# Patient Record
Sex: FEMALE | Race: WHITE | NOT HISPANIC OR LATINO | Employment: PART TIME | URBAN - METROPOLITAN AREA
[De-identification: names, ages, dates, MRNs, and addresses within clinical notes are randomized per-mention and may not be internally consistent; named-entity substitution may affect disease eponyms.]

---

## 2020-05-13 ENCOUNTER — HOSPITAL ENCOUNTER (OUTPATIENT)
Dept: OTHER | Facility: HOSPITAL | Age: 20
Discharge: HOME | End: 2020-05-13
Attending: PHYSICIAN ASSISTANT

## 2020-05-13 ENCOUNTER — OFFICE VISIT CONVERTED (OUTPATIENT)
Dept: INTERNAL MEDICINE | Facility: CLINIC | Age: 20
End: 2020-05-13
Attending: PHYSICIAN ASSISTANT

## 2020-05-13 LAB
ALBUMIN SERPL-MCNC: 4.6 G/DL (ref 3.5–5)
ALBUMIN/GLOB SERPL: 1.4 {RATIO} (ref 1.4–2.6)
ALP SERPL-CCNC: 58 U/L (ref 50–130)
ALT SERPL-CCNC: 14 U/L (ref 10–40)
ANION GAP SERPL CALC-SCNC: 17 MMOL/L (ref 8–19)
AST SERPL-CCNC: 16 U/L (ref 15–50)
BASOPHILS # BLD AUTO: 0.03 10*3/UL (ref 0–0.2)
BASOPHILS NFR BLD AUTO: 0.2 % (ref 0–3)
BILIRUB SERPL-MCNC: 0.37 MG/DL (ref 0.2–1.3)
BUN SERPL-MCNC: 8 MG/DL (ref 5–25)
BUN/CREAT SERPL: 12 {RATIO} (ref 6–20)
CALCIUM SERPL-MCNC: 9 MG/DL (ref 8.7–10.4)
CHLORIDE SERPL-SCNC: 104 MMOL/L (ref 99–111)
CHOLEST SERPL-MCNC: 124 MG/DL (ref 107–200)
CHOLEST/HDLC SERPL: 2.4 {RATIO} (ref 3–6)
CONV ABS IMM GRAN: 0.04 10*3/UL (ref 0–0.2)
CONV CO2: 21 MMOL/L (ref 22–32)
CONV IMMATURE GRAN: 0.3 % (ref 0–1.8)
CONV TOTAL PROTEIN: 7.8 G/DL (ref 6.3–8.2)
CREAT UR-MCNC: 0.68 MG/DL (ref 0.5–0.9)
DEPRECATED RDW RBC AUTO: 40.1 FL (ref 36.4–46.3)
EOSINOPHIL # BLD AUTO: 0.17 10*3/UL (ref 0–0.7)
EOSINOPHIL # BLD AUTO: 1.3 % (ref 0–7)
ERYTHROCYTE [DISTWIDTH] IN BLOOD BY AUTOMATED COUNT: 12 % (ref 11.7–14.4)
EST. AVERAGE GLUCOSE BLD GHB EST-MCNC: 88 MG/DL
FERRITIN SERPL-MCNC: 94 NG/ML (ref 10–200)
GFR SERPLBLD BASED ON 1.73 SQ M-ARVRAT: >60 ML/MIN/{1.73_M2}
GLOBULIN UR ELPH-MCNC: 3.2 G/DL (ref 2–3.5)
GLUCOSE SERPL-MCNC: 91 MG/DL (ref 65–99)
HBA1C MFR BLD: 4.7 % (ref 3.5–5.7)
HCT VFR BLD AUTO: 39.9 % (ref 37–47)
HDLC SERPL-MCNC: 52 MG/DL (ref 40–60)
HGB BLD-MCNC: 13.7 G/DL (ref 12–16)
IRON SATN MFR SERPL: 34 % (ref 20–55)
IRON SERPL-MCNC: 131 UG/DL (ref 60–170)
LDLC SERPL CALC-MCNC: 56 MG/DL (ref 70–100)
LYMPHOCYTES # BLD AUTO: 2.78 10*3/UL (ref 1–5)
LYMPHOCYTES NFR BLD AUTO: 21.6 % (ref 20–45)
MCH RBC QN AUTO: 31.2 PG (ref 27–31)
MCHC RBC AUTO-ENTMCNC: 34.3 G/DL (ref 33–37)
MCV RBC AUTO: 90.9 FL (ref 81–99)
MONOCYTES # BLD AUTO: 0.9 10*3/UL (ref 0.2–1.2)
MONOCYTES NFR BLD AUTO: 7 % (ref 3–10)
NEUTROPHILS # BLD AUTO: 8.97 10*3/UL (ref 2–8)
NEUTROPHILS NFR BLD AUTO: 69.6 % (ref 30–85)
NRBC CBCN: 0 % (ref 0–0.7)
OSMOLALITY SERPL CALC.SUM OF ELEC: 284 MOSM/KG (ref 273–304)
PLATELET # BLD AUTO: 281 10*3/UL (ref 130–400)
PMV BLD AUTO: 11.4 FL (ref 9.4–12.3)
POTASSIUM SERPL-SCNC: 3.5 MMOL/L (ref 3.5–5.3)
RBC # BLD AUTO: 4.39 10*6/UL (ref 4.2–5.4)
SODIUM SERPL-SCNC: 138 MMOL/L (ref 135–147)
T4 FREE SERPL-MCNC: 1.6 NG/DL (ref 0.9–1.8)
TIBC SERPL-MCNC: 388 UG/DL (ref 245–450)
TRANSFERRIN SERPL-MCNC: 271 MG/DL (ref 250–380)
TRIGL SERPL-MCNC: 82 MG/DL (ref 40–150)
TSH SERPL-ACNC: 1.77 M[IU]/L (ref 0.27–4.2)
VLDLC SERPL-MCNC: 16 MG/DL (ref 5–37)
WBC # BLD AUTO: 12.89 10*3/UL (ref 4.8–10.8)

## 2020-05-20 ENCOUNTER — TELEMEDICINE CONVERTED (OUTPATIENT)
Dept: INTERNAL MEDICINE | Facility: CLINIC | Age: 20
End: 2020-05-20
Attending: PHYSICIAN ASSISTANT

## 2020-05-20 ENCOUNTER — HOSPITAL ENCOUNTER (OUTPATIENT)
Dept: OTHER | Facility: HOSPITAL | Age: 20
Discharge: HOME | End: 2020-05-20
Attending: PHYSICIAN ASSISTANT

## 2020-05-20 LAB
BASOPHILS # BLD AUTO: 0.02 10*3/UL (ref 0–0.2)
BASOPHILS # BLD: 1 % (ref 0–3)
BASOPHILS NFR BLD AUTO: 0.3 % (ref 0–3)
CONV ABS BANDS: 0 % (ref 1–5)
CONV ABS IMM GRAN: 0.01 10*3/UL (ref 0–0.2)
CONV IMMATURE GRAN: 0.2 % (ref 0–1.8)
CONV SEGMENTED NEUTROPHILS: 59 % (ref 45–70)
DEPRECATED RDW RBC AUTO: 40.1 FL (ref 36.4–46.3)
EOSINOPHIL # BLD AUTO: 0.14 10*3/UL (ref 0–0.7)
EOSINOPHIL # BLD AUTO: 2.4 % (ref 0–7)
EOSINOPHIL NFR BLD AUTO: 2 % (ref 0–7)
ERYTHROCYTE [DISTWIDTH] IN BLOOD BY AUTOMATED COUNT: 12 % (ref 11.7–14.4)
HCT VFR BLD AUTO: 40.2 % (ref 37–47)
HGB BLD-MCNC: 13.4 G/DL (ref 12–16)
LYMPHOCYTES # BLD AUTO: 1.85 10*3/UL (ref 1–5)
LYMPHOCYTES NFR BLD AUTO: 32.2 % (ref 20–45)
MCH RBC QN AUTO: 30.4 PG (ref 27–31)
MCHC RBC AUTO-ENTMCNC: 33.3 G/DL (ref 33–37)
MCV RBC AUTO: 91.2 FL (ref 81–99)
MONOCYTES # BLD AUTO: 0.37 10*3/UL (ref 0.2–1.2)
MONOCYTES NFR BLD AUTO: 6.4 % (ref 3–10)
MONOCYTES NFR BLD MANUAL: 6 % (ref 3–10)
NEUTROPHILS # BLD AUTO: 3.35 10*3/UL (ref 2–8)
NEUTROPHILS NFR BLD AUTO: 58.5 % (ref 30–85)
NRBC CBCN: 0 % (ref 0–0.7)
NUC CELL # PRT MANUAL: 0 /100{WBCS}
PLAT MORPH BLD: NORMAL
PLATELET # BLD AUTO: 237 10*3/UL (ref 130–400)
PMV BLD AUTO: 11.5 FL (ref 9.4–12.3)
RBC # BLD AUTO: 4.41 10*6/UL (ref 4.2–5.4)
SMALL PLATELETS BLD QL SMEAR: ADEQUATE
VARIANT LYMPHS NFR BLD MANUAL: 32 % (ref 20–45)
WBC # BLD AUTO: 5.74 10*3/UL (ref 4.8–10.8)

## 2020-05-21 ENCOUNTER — HOSPITAL ENCOUNTER (OUTPATIENT)
Dept: OTHER | Facility: HOSPITAL | Age: 20
Discharge: HOME | End: 2020-05-21
Attending: PHYSICIAN ASSISTANT

## 2020-05-21 LAB
ALBUMIN SERPL-MCNC: 4.6 G/DL (ref 3.5–5)
ALBUMIN/GLOB SERPL: 1.6 {RATIO} (ref 1.4–2.6)
ALP SERPL-CCNC: 49 U/L (ref 50–130)
ALT SERPL-CCNC: 10 U/L (ref 10–40)
ANION GAP SERPL CALC-SCNC: 19 MMOL/L (ref 8–19)
AST SERPL-CCNC: 18 U/L (ref 15–50)
BASOPHILS # BLD AUTO: 0.03 10*3/UL (ref 0–0.2)
BASOPHILS NFR BLD AUTO: 0.5 % (ref 0–3)
BILIRUB SERPL-MCNC: 0.44 MG/DL (ref 0.2–1.3)
BUN SERPL-MCNC: 7 MG/DL (ref 5–25)
BUN/CREAT SERPL: 11 {RATIO} (ref 6–20)
CALCIUM SERPL-MCNC: 9.2 MG/DL (ref 8.7–10.4)
CHLORIDE SERPL-SCNC: 106 MMOL/L (ref 99–111)
CHOLEST SERPL-MCNC: 111 MG/DL (ref 107–200)
CHOLEST/HDLC SERPL: 2.4 {RATIO} (ref 3–6)
CONV ABS IMM GRAN: 0.01 10*3/UL (ref 0–0.2)
CONV CO2: 19 MMOL/L (ref 22–32)
CONV IMMATURE GRAN: 0.2 % (ref 0–1.8)
CONV TOTAL PROTEIN: 7.5 G/DL (ref 6.3–8.2)
CREAT UR-MCNC: 0.64 MG/DL (ref 0.5–0.9)
DEPRECATED RDW RBC AUTO: 48.3 FL (ref 36.4–46.3)
EOSINOPHIL # BLD AUTO: 0.15 10*3/UL (ref 0–0.7)
EOSINOPHIL # BLD AUTO: 2.5 % (ref 0–7)
ERYTHROCYTE [DISTWIDTH] IN BLOOD BY AUTOMATED COUNT: 13.1 % (ref 11.7–14.4)
EST. AVERAGE GLUCOSE BLD GHB EST-MCNC: 97 MG/DL
FERRITIN SERPL-MCNC: 92 NG/ML (ref 10–200)
GFR SERPLBLD BASED ON 1.73 SQ M-ARVRAT: >60 ML/MIN/{1.73_M2}
GLOBULIN UR ELPH-MCNC: 2.9 G/DL (ref 2–3.5)
GLUCOSE SERPL-MCNC: 84 MG/DL (ref 65–99)
HBA1C MFR BLD: 5 % (ref 3.5–5.7)
HCT VFR BLD AUTO: 44.9 % (ref 37–47)
HDLC SERPL-MCNC: 47 MG/DL (ref 40–60)
HGB BLD-MCNC: 13.5 G/DL (ref 12–16)
IRON SATN MFR SERPL: 32 % (ref 20–55)
IRON SERPL-MCNC: 111 UG/DL (ref 60–170)
LDLC SERPL CALC-MCNC: 54 MG/DL (ref 70–100)
LYMPHOCYTES # BLD AUTO: 1.93 10*3/UL (ref 1–5)
LYMPHOCYTES NFR BLD AUTO: 32.6 % (ref 20–45)
MCH RBC QN AUTO: 30.3 PG (ref 27–31)
MCHC RBC AUTO-ENTMCNC: 30.1 G/DL (ref 33–37)
MCV RBC AUTO: 100.9 FL (ref 81–99)
MONOCYTES # BLD AUTO: 0.38 10*3/UL (ref 0.2–1.2)
MONOCYTES NFR BLD AUTO: 6.4 % (ref 3–10)
NEUTROPHILS # BLD AUTO: 3.42 10*3/UL (ref 2–8)
NEUTROPHILS NFR BLD AUTO: 57.8 % (ref 30–85)
NRBC CBCN: 0 % (ref 0–0.7)
OSMOLALITY SERPL CALC.SUM OF ELEC: 287 MOSM/KG (ref 273–304)
PLATELET # BLD AUTO: 241 10*3/UL (ref 130–400)
PMV BLD AUTO: 11.1 FL (ref 9.4–12.3)
POTASSIUM SERPL-SCNC: 4 MMOL/L (ref 3.5–5.3)
RBC # BLD AUTO: 4.45 10*6/UL (ref 4.2–5.4)
SODIUM SERPL-SCNC: 140 MMOL/L (ref 135–147)
T4 FREE SERPL-MCNC: 1.4 NG/DL (ref 0.9–1.8)
TIBC SERPL-MCNC: 343 UG/DL (ref 245–450)
TRANSFERRIN SERPL-MCNC: 240 MG/DL (ref 250–380)
TRIGL SERPL-MCNC: 50 MG/DL (ref 40–150)
TSH SERPL-ACNC: 1.35 M[IU]/L (ref 0.27–4.2)
VLDLC SERPL-MCNC: 10 MG/DL (ref 5–37)
WBC # BLD AUTO: 5.92 10*3/UL (ref 4.8–10.8)

## 2020-05-22 ENCOUNTER — HOSPITAL ENCOUNTER (OUTPATIENT)
Dept: OTHER | Facility: HOSPITAL | Age: 20
Discharge: HOME | End: 2020-05-22
Attending: PHYSICIAN ASSISTANT

## 2020-05-24 LAB — BACTERIA UR CULT: NORMAL

## 2020-07-14 ENCOUNTER — HOSPITAL ENCOUNTER (OUTPATIENT)
Dept: URGENT CARE | Facility: CLINIC | Age: 20
Discharge: HOME | End: 2020-07-14
Attending: PHYSICIAN ASSISTANT

## 2020-11-21 ENCOUNTER — HOSPITAL ENCOUNTER (OUTPATIENT)
Dept: URGENT CARE | Facility: CLINIC | Age: 20
Discharge: HOME | End: 2020-11-21
Attending: PHYSICIAN ASSISTANT

## 2021-05-10 NOTE — H&P
History and Physical      Patient Name: Wanda Coffey   Patient ID: [id number]   Sex: Female   YOB: 2000        Visit Date: May 13, 2020    Provider: Sadie Blackwell PA-C   Location: [facility]   Location Address: [address] [place] [address], [address]  [place], [place]  [id number]   Location Phone: [phone]          Chief Complaint  · Establish care  · Has been feeling shaky and like passing out per patient       History Of Present Illness  Wanda Coffey is a 19 year old /White female who presents for evaluation and treatment of:      Establish care   Previously seeing [facility]    Yesterday and today patient felt shaky and like she was going to pass out and not sure why.  She was going to work and felt like she was going to pass out. She is a , at work she felt shaky. She checked bg and it was 72.   She denies syncope, seizures, loc.  Denies associated cp, palpitations, heart racing, sob.  denies caffeine intake. Drinks 2 bottles of water/day.   she states she eats 3 meals/day, denies skipping meals.   States she has always been petite. Denies premature birth.    Pt takes iron pills daily.   She states during her period she was feeling lightheaded. Pharmacist told her to start iron and it has helped.  Periods are 1x/month, last 7 days. Denies heavy bleeding or clotting.    Allergies: She is taking OTC Zyrtec    She states she feels cold all the time.    She has never been told bp elevated in the past.       Past Medical History  Reviewed None Changed         Past Surgical History  Reviewed None Changed         Medication List  Name Date Started Instructions   Allergy Relief (cetirizine) 10 mg oral tablet  take 1 tablet (10 mg) by oral route once daily   iron oral  --          Allergy List  Reviewed None Changed       Allergies Reconciled  Family Medical History  Disease Name Relative/Age Notes   Heart Disease  --    Breast Neoplasm Grandmother (maternal)/    "--    Diabetes Grandfather (maternal)/   --          Review of Systems  · Constitutional  o Denies  o : fatigue, night sweats  · Eyes  o Denies  o : double vision, blurred vision  · HENT  o Admits  o : nasal congestion  o Denies  o : vertigo, recent head injury  · Cardiovascular  o Denies  o : chest pain, irregular heart beats  · Respiratory  o Denies  o : shortness of breath, productive cough  · Gastrointestinal  o Denies  o : nausea, vomiting  · Genitourinary  o Denies  o : dysuria, urinary retention  · Integument  o Denies  o : hair growth change, new skin lesions  · Neurologic  o Admits  o : tremors, dizziness/lightheadedness   o Denies  o : altered mental status, muscular weakness, tingling or numbness, difficulty concentrating, speech difficulties, seizures, loss of balance, developmental delay  · Musculoskeletal  o Denies  o : joint swelling, limitation of motion  · Endocrine  o Admits  o : cold intolerance  o Denies  o : heat intolerance  · Heme-Lymph  o Denies  o : petechiae, lymph node enlargement or tenderness  · Allergic-Immunologic  o Denies  o : frequent illnesses      Vitals  Date Time BP Position Site L\R Cuff Size HR RR TEMP (F) WT  HT  BMI kg/m2 BSA m2 O2 Sat HC       05/13/2020 02:37 /82 Sitting    118 - R  98.4 92lbs 4oz 4'  9.5\" 19.62 1.3 100 %        05/13/2020 02:37 /70 Supine    100 - R   05/13/2020 02:37 /82 Sitting    116 - R   05/13/2020 02:37 /80 Standing    110 - R             Physical Examination  · Constitutional  o Appearance  o : no acute distress, very petitie  · Head and Face  o Head  o :   § Inspection  § : atraumatic, normocephalic  · Eyes  o Eyes  o : extraocular movements intact, no scleral icterus, no conjunctival injection  · Ears, Nose, Mouth and Throat  o Ears  o :   § External Ears  § : normal  § Otoscopic Examination  § : tympanic membrane appearance within normal limits bilaterally  o Nose  o :   § Intranasal Exam  § : nares patent  o Oral " Cavity  o :   § Oral Mucosa  § : moist mucous membranes  o Throat  o :   § Oropharynx  § : no inflammation or lesions present, tonsils within normal limits  · Neck  o Thyroid  o : gland size normal, nontender, no nodules or masses present on palpation, symmetric  · Respiratory  o Respiratory Effort  o : breathing comfortably, symmetric chest rise  o Auscultation of Lungs  o : clear to asculatation bilaterally, no wheezes, rales, or rhonchii  · Cardiovascular  o Heart  o :   § Auscultation of Heart  § : tachycardia, regular rhythm, no murmurs, rubs, or gallops  o Peripheral Vascular System  o :   § Extremities  § : no edema  · Gastrointestinal  o Abdominal Examination  o :   § Abdomen  § : bowel sounds present, non-distended, non-tender  · Lymphatic  o Neck  o : no lymphadenopathy present  o Supraclavicular Nodes  o : no supraclavicular nodes  · Skin and Subcutaneous Tissue  o General Inspection  o : no lesions present, no areas of discoloration, skin turgor normal  · Neurologic  o Mental Status Examination  o :   § Orientation  § : grossly oriented to person, place and time  o Cranial Nerves  o : crainial nerves 2-12 grossly intact, pupillary response to light brisk, eye movements within normal limits, no nystagmus present, no ptosis present, head turning strength normal  o Gait and Station  o :   § Gait Screening  § : normal gait  · Psychiatric  o General  o : normal mood and affect              Assessment  · Allergic rhinitis due to allergen     477.9/J30.9  · Blood pressure elevated without history of HTN     796.2/R03.0  Discussed bp elevation at today's visit. Discussed risks of bp elevation including death, mi, stroke, ckd, blindness. Low salt diet, increase exercise. We will monitor at follow up and discuss bp medications if necessary. To er if cp, palpitations, vision loss, unilateral weakness, altered mental status. Pt understands and agrees with plan.  · Lightheadedness     780.4/R42  Discussed ddx  lightheadedness. Labs ordered today. Reviewed EKG showing sinus tachy. Increase water intake to minimum 3 cups daily and make sure to eat 3 meals daily. RTC or ER if sx worsen, syncope, loc, palpitations, cp, vision loss, worst ha of life.  · Orthostatic hypotension     458.0/I95.1  Discussed orthostatic hypotension. Increase water intake, limit other fluid intake as that can dehydrate pt. Discussed changing position slowly. Will recheck at follow up.  · Tachycardia     785.0/R00.0    Problems Reconciled  Plan  · Orders  o CBC with Auto Diff [facility] ([id number]) - 796.2/R03.0, 780.4/R42, 458.0/I95.1, 785.0/R00.0 - 05/13/2020  o CMP [facility] ([id number]) - 796.2/R03.0, 780.4/R42, 458.0/I95.1, 785.0/R00.0 - 05/13/2020  o Free T4 ([id number]) - 796.2/R03.0, 780.4/R42, 458.0/I95.1, 785.0/R00.0 - 05/13/2020  o Hgb A1c [facility] ([id number]) - 796.2/R03.0, 780.4/R42, 458.0/I95.1, 785.0/R00.0 - 05/13/2020  o TSH [facility] ([id number]) - 796.2/R03.0, 780.4/R42, 458.0/I95.1, 785.0/R00.0 - 05/13/2020  o ACO-39: Current medications updated and reviewed () - - 05/13/2020  o Ferritin ([id number]) - 796.2/R03.0, 780.4/R42, 458.0/I95.1, 785.0/R00.0 - 05/13/2020  o Iron + TIBC ser ([id number], [id number]) - 796.2/R03.0, 780.4/R42, 458.0/I95.1, 785.0/R00.0 - 05/13/2020  · Medications  o Medications have been Reconciled  o Transition of Care or Provider Policy  · Instructions  o Rest. Increase Fluids.  o Patient was educated/instructed on their diagnosis, treatment and medications prior to discharge from the clinic today.  · Disposition  o Call or Return if symptoms worsen or persist.  o Follow up in 2 weeks  o Labs drawn in office today  o EKG performed in clinic            Electronically Signed by: Sadie Blackwell PA-C -Author on May 14, 2020 08:56:53 AM

## 2021-05-13 NOTE — PROGRESS NOTES
Progress Note      Patient Name: Wanda Coffey   Patient ID: [id number]   Sex: Female   YOB: 2000        Visit Date: May 20, 2020    Provider: Sadie Blackwell PA-C   Location: [facility]   Location Address: [address] [place] [address], [address]  [place], [place]  [id number]   Location Phone: [phone]          History Of Present Illness  Video Conferencing Visit  Wanda Coffey is a 19 year old /White female who is presenting for evaluation via video conferencing via zoom. Verbal consent obtained before beginning visit.   The following staff were present during this visit: Sadie Blackwell PA-C   Wanda Coffey is a 19 year old /White female who presents for evaluation and treatment of:      shaking follow up.  She states yesterday at work she started feeling shaky again. She had eaten breakfast before work. She has increased water intake to 3 cups daily. She states she is still drinking caffeine, she states it helps if she is feeling shaky.   She notices sx more at work.   mom states that pt aunt had similar sx and had a UTI. Pt denies frequency, urgency, incontinence.   Denies any more dizziness. Denies syncope, loc, ha, vision changes, cp, palpitations    WBC elevated: denies fever, cough, congestion, recent infection, abd pain.    BP elevated: pt has not checked bp at home.       Medication List  Name Date Started Instructions   Allergy Relief (cetirizine) 10 mg oral tablet  take 1 tablet (10 mg) by oral route once daily   iron oral  --        Allergies Reconciled  Family Medical History  Disease Name Relative/Age Notes   Heart Disease  --    Breast Neoplasm Grandmother (maternal)/   --    Diabetes Grandfather (maternal)/   --          Review of Systems  · Constitutional  o Denies  o : fever, fatigue, weight loss, weight gain  · Cardiovascular  o Admits  o : palpitations  o Denies  o : lower extremity edema, claudication, chest  pressure  · Respiratory  o Denies  o : shortness of breath, wheezing, frequent cough, hemoptysis, dyspnea on exertion  · Gastrointestinal  o Denies  o : nausea, vomiting, diarrhea, constipation, abdominal pain  · Neurologic  o Admits  o : tremor  o Denies  o : altered mental status, muscular weakness, tingling or numbness, slurred speech , dizziness      Physical Examination     Gen: well-nourished, no acute distress  HENT: atraumatic, normocephalic  Eyes: extraocular movements intact, no scleral icterus  Lung: breathing comfortably, no cough  Skin: no visible rash, no lesions  Neuro: grossly oriented to person, place, and time. no facial droop   Psych: normal mood and affect             Assessment  · Elevated WBC count     288.60/D72.829  discussed wbc elevation on labs, pt will rtc to recheck. Will get UA to make sure no UTI per mom request.  · Tremor     781.0/R25.1  Discussed normal bloodwork aside from elevated WBC. Does not sound like pt has had recent or current infection. Discussed since pt bp elevated at her visit she needs to monitor bp when she is feeling symptoms and at home, she will call us with bp log in 1 wk. Discussed need to avoid all energy drinks and caffeine and see if sx improve. To er if sx worsen, chest pain, palpitations, syncope, loc. pt and mom understand and agree      Plan  · Orders  o ACO-39: Current medications updated and reviewed () - - 05/20/2020  · Medications  o Medications have been Reconciled  o Transition of Care or Provider Policy  · Instructions  o Patient was educated/instructed on their diagnosis, treatment and medications prior to discharge from the clinic today.  · Disposition  o Call or Return if symptoms worsen or persist.            Electronically Signed by: Sadie Blackwell PA-C -Author on May 20, 2020 12:44:55 PM

## 2021-05-15 VITALS
HEART RATE: 118 BPM | WEIGHT: 92.25 LBS | HEIGHT: 57 IN | TEMPERATURE: 98.4 F | SYSTOLIC BLOOD PRESSURE: 136 MMHG | DIASTOLIC BLOOD PRESSURE: 82 MMHG | BODY MASS INDEX: 19.9 KG/M2 | OXYGEN SATURATION: 100 %

## 2021-05-28 ENCOUNTER — OFFICE VISIT CONVERTED (OUTPATIENT)
Dept: INTERNAL MEDICINE | Facility: CLINIC | Age: 21
End: 2021-05-28
Attending: NURSE PRACTITIONER

## 2021-05-28 ENCOUNTER — HOSPITAL ENCOUNTER (OUTPATIENT)
Dept: OTHER | Facility: HOSPITAL | Age: 21
Discharge: HOME | End: 2021-05-28
Attending: NURSE PRACTITIONER

## 2021-06-06 NOTE — PROGRESS NOTES
Progress Note      Patient Name: Wanda Coffey   Patient ID: [id number]   Sex: Female   YOB: 2000    Primary Care Provider: Olivia MARROQUIN   Referring Provider: Olivia MARROQUIN    Visit Date: May 28, 2021    Provider: LOPEZ OROURKE   Location: [facility]   Location Address: [address] [place] [address]  [id number]   Location Phone: [phone]          Chief Complaint  · Right knee pain   · Rash      History Of Present Illness  Wanda Coffey is a 20 year old /White female who presents for evaluation and treatment of:      Patient presents for acute visit for knee pain and rash.    Reports she sprain the right knee 2 years ago in marching band. Having pain recent again in the right knee. She is taking aleve, ice, propping it up at night and wearing a brace. never had imaging. Standing on concrete floors at work and work worsens the pain. No redness and warmth within the joint.     Patient reports she has been outside a lot. She has had a rash that comes and goes on. She has seen the rash twice over the past week. No tick bites. Denies any other type of bite. Use sensitive laundry detergent. Was exposed to molasses which is new over the past week.            Medication List  Name Date Started Instructions   Allergy Relief (cetirizine) 10 mg oral tablet  take 1 tablet (10 mg) by oral route once daily       Allergies Reconciled  Family Medical History  Disease Name Relative/Age Notes   Heart Disease  --    Breast Neoplasm Grandmother (maternal)/   --    Diabetes Grandfather (maternal)/   --          Review of Systems  · Constitutional  o Denies  o : fever, fatigue, weight loss, weight gain  · Cardiovascular  o Denies  o : lower extremity edema, claudication, chest pressure, palpitations  · Respiratory  o Denies  o : shortness of breath, wheezing, cough, hemoptysis, dyspnea on exertion  · Gastrointestinal  o Denies  o : nausea, vomiting,  "diarrhea, constipation, abdominal pain  · Integument  o Admits  o : itching, pigmentation changes  o Denies  o : rash  · Musculoskeletal  o Admits  o : joint pain, knee pain      Vitals  Date Time BP Position Site L\R Cuff Size HR RR TEMP (F) WT  HT  BMI kg/m2 BSA m2 O2 Sat FR L/min FiO2        05/13/2020 02:37 /82 Sitting    118 - R  98.4 92lbs 4oz 4'  9.5\" 19.62 1.3 100 %      05/28/2021 08:25 /72 Sitting    103 - R  98.2 108lbs 2oz 4'  9.5\" 22.99 1.41 100 %  21%          Physical Examination  · Constitutional  o Appearance  o : no acute distress, well-nourished  · Head and Face  o Head  o :   § Inspection  § : atraumatic, normocephalic  · Ears, Nose, Mouth and Throat  o Ears  o :   § External Ears  § : normal  § Otoscopic Examination  § : tympanic membrane appearance within normal limits bilaterally  o Nose  o :   § Intranasal Exam  § : nares patent  o Oral Cavity  o :   § Oral Mucosa  § : moist mucous membranes  o Throat  o :   § Oropharynx  § : no inflammation or lesions present, tonsils within normal limits  · Respiratory  o Respiratory Effort  o : breathing comfortably, symmetric chest rise  o Auscultation of Lungs  o : clear to asculatation bilaterally, no wheezes, rales, or rhonchii  · Cardiovascular  o Heart  o :   § Auscultation of Heart  § : regular rate and rhythm, no murmurs, rubs, or gallops  o Peripheral Vascular System  o :   § Extremities  § : no edema  · Gastrointestinal  o Abdominal Examination  o : abdomen nontender to palpation, normal bowel sounds, tone normal without rigidity or guarding, no masses present, abdomen scaphoid upon supine  · Lymphatic  o Neck  o : no lymphadenopathy present  o Supraclavicular Nodes  o : no supraclavicular nodes  · Skin and Subcutaneous Tissue  o General Inspection  o : no lesions present, no areas of discoloration, skin turgor normal  · Neurologic  o Mental Status Examination  o :   § Orientation  § : grossly oriented to person, place and " time  o Gait and Station  o :   § Gait Screening  § : normal gait  · Right Knee  o Inspection  o : no redness, no swelling, no deformity, no bruising, no effusion  o Palpation  o : lateral joint line tenderness absent, lateral patellar tenderness absent, medial joint line tenderness absent, patellar tendon tenderness absent, no crepitus, non-tender, pain with patella compression  o Range of Motion  o : flexion normal 120 degrees, extension normal 0-5 degrees, internal rotation normal 10 degrees, external rotation normal 10 degrees, good strength of quadriceps, hamstrings, dorsiflexors and plantar flexors  o Special Tests  o : Apprehension negative, Dial test negative, Lachman Test negative, Jazmin Test negative, Anterior Drawer Test negative, Posterior Drawer Test negative, Pivot Shift Test negative, Varus Laxity negative, Valgus Laxity negative  o Neurovascular  o : neurovascularly intact, popliteal pulse present          Assessment  · Right knee pain     719.46/M25.561  xray ordered.   · Knee pain, right     719.46/M25.561  · Contact dermatitis     692.9/L25.9  triamcinolone cream prescribed. Educated patient to monitor exposure and call with any questions or concerns.     Problems Reconciled  Plan  · Orders  o ACO-39: Current medications updated and reviewed (1159F, ) - - 05/28/2021  o Knee (Right) 3 views X-Ray [facility] ([id number]) - - 05/28/2021   done in office  · Medications  o ibuprofen 600 mg oral tablet   SIG: take 1 tablet (600 mg) by oral route 3 times per day with food   DISP: (30) Tablet with 0 refills  Prescribed on 05/28/2021     o triamcinolone acetonide 0.5 % topical ointment   SIG: apply a thin layer to the affected area(s) by topical route 2 times per day for 30 days   DISP: (1) Bottle with 0 refills  Prescribed on 05/28/2021     o Medications have been Reconciled  o Transition of Care or Provider Policy  · Instructions  o Patient was educated/instructed on their diagnosis,  treatment and medications prior to discharge from the clinic today.  o Call the office with any concerns or questions.  · Disposition  o Call or Return if symptoms worsen or persist.  o Meds sent to pharmacy  o X-rays here            Electronically Signed by: LOPEZ OROURKE -Author on May 28, 2021 10:12:57 AM

## 2021-06-10 ENCOUNTER — TELEPHONE (OUTPATIENT)
Dept: INTERNAL MEDICINE | Facility: CLINIC | Age: 21
End: 2021-06-10

## 2021-06-10 NOTE — TELEPHONE ENCOUNTER
Caller: Wanda Coffey    Relationship to patient: Self    Best call back number: [phone]    Chief complaint: KNOT ON RIGHT KNEE    Type of visit: OFFICE    Requested date: 06/11/2021 AFTER 2:00PM

## 2021-06-11 ENCOUNTER — OFFICE VISIT (OUTPATIENT)
Dept: INTERNAL MEDICINE | Facility: CLINIC | Age: 21
End: 2021-06-11

## 2021-06-11 VITALS
HEART RATE: 75 BPM | BODY MASS INDEX: 22.61 KG/M2 | TEMPERATURE: 98.1 F | OXYGEN SATURATION: 100 % | DIASTOLIC BLOOD PRESSURE: 70 MMHG | WEIGHT: 104.8 LBS | SYSTOLIC BLOOD PRESSURE: 110 MMHG | HEIGHT: 57 IN

## 2021-06-11 DIAGNOSIS — M25.561 RIGHT KNEE PAIN, UNSPECIFIED CHRONICITY: Primary | ICD-10-CM

## 2021-06-11 PROBLEM — M25.461 KNEE EFFUSION, RIGHT: Status: RESOLVED | Noted: 2021-06-11 | Resolved: 2021-06-11

## 2021-06-11 PROBLEM — M25.461 KNEE EFFUSION, RIGHT: Status: ACTIVE | Noted: 2021-06-11

## 2021-06-11 PROCEDURE — 99213 OFFICE O/P EST LOW 20 MIN: CPT | Performed by: INTERNAL MEDICINE

## 2021-06-11 RX ORDER — IBUPROFEN 600 MG/1
600 TABLET ORAL
COMMUNITY
Start: 2021-05-28 | End: 2022-09-14 | Stop reason: ALTCHOICE

## 2021-06-11 RX ORDER — CETIRIZINE HYDROCHLORIDE 10 MG/1
TABLET ORAL
COMMUNITY
End: 2021-08-03 | Stop reason: SDUPTHER

## 2021-06-11 RX ORDER — TRIAMCINOLONE ACETONIDE 5 MG/G
OINTMENT TOPICAL
COMMUNITY
Start: 2021-05-28 | End: 2022-10-26

## 2021-06-11 NOTE — TELEPHONE ENCOUNTER
PATIENT IS CALLING BACK TO BEEN SEEN STILL FOR KNOT ON RIGHT KNEE.    PLEASE ADVISE     CALL BACK  [phone]

## 2021-06-11 NOTE — TELEPHONE ENCOUNTER
Left detailed message that I scheduled her with Dr. munoz today at 1:30pm. Patient to return call if she needs to reschedule.

## 2021-06-11 NOTE — ASSESSMENT & PLAN NOTE
Will send in referral for PT and continue NSAIDs, icing, and wearing knee brace.   If no improvement in 2-4 weeks, will proceed with further imaging (MRI) and possible referral to Ortho for further intervention.

## 2021-06-11 NOTE — PROGRESS NOTES
"Chief Complaint  Knee Pain (knot on right knee )    Subjective          Wanda Coffey presents to [facility]  History of Present Illness    States that when she was in [employer] band she was doing band camp and noticed knee pain after this however this was a few years ago it has not bothered her regularly since then    Currently working at [employer] and she is on her feet   Did hear two popping sounds at work  No pain with the popping sensation  No giving out    Had an x-ray done on the knee about 2 weeks ago  The new swelling started     Has been taking ibuprofen 3 times a day  She has done this for about 4 days  The pain is a lot better with this however she has noticed a new knot that has come up when you put pressure on the knot it is painful    Good stability      Objective   Vital Signs:   /70   Pulse 75   Temp 98.1 °F (36.7 °C)   Ht 144.8 cm (57\")   Wt 47.5 kg (104 lb 12.8 oz)   SpO2 100%   BMI 22.68 kg/m²     Physical Exam  Vitals reviewed.   Constitutional:       Appearance: Normal appearance. She is well-developed.   HENT:      Head: Normocephalic and atraumatic.      Mouth/Throat:      Pharynx: No oropharyngeal exudate.   Eyes:      Conjunctiva/sclera: Conjunctivae normal.      Pupils: Pupils are equal, round, and reactive to light.   Cardiovascular:      Rate and Rhythm: Normal rate and regular rhythm.      Heart sounds: No murmur heard.   No friction rub. No gallop.    Pulmonary:      Effort: Pulmonary effort is normal.      Breath sounds: Normal breath sounds. No wheezing or rhonchi.   Musculoskeletal:      Comments: Slight swelling of right knee in the inferior lateral position   Skin:     General: Skin is warm and dry.   Neurological:      Mental Status: She is alert and oriented to person, place, and time.      Cranial Nerves: No cranial nerve deficit.   Psychiatric:         Mood and Affect: Affect normal.         Behavior: Behavior " normal.         Thought Content: Thought content normal.        Result Review :   The following data was reviewed by: Nidia Barrios MD on 06/11/2021:      Data reviewed: Radiologic studies Knee x-ray which was normal   Procedures      Assessment and Plan    Diagnoses and all orders for this visit:    1. Right knee pain, unspecified chronicity (Primary)  Assessment & Plan:  Will send in referral for PT and continue NSAIDs, icing, and wearing knee brace.   If no improvement in 2-4 weeks, will proceed with further imaging (MRI) and possible referral to Ortho for further intervention.     Orders:  -     Ambulatory Referral to Physical Therapy      Follow Up   No follow-ups on file.  Patient was given instructions and counseling regarding her condition or for health maintenance advice. Please see specific information pulled into the AVS if appropriate.

## 2021-06-14 ENCOUNTER — TELEPHONE (OUTPATIENT)
Dept: INTERNAL MEDICINE | Facility: CLINIC | Age: 21
End: 2021-06-14

## 2021-06-14 NOTE — TELEPHONE ENCOUNTER
Caller: Wanda Coffey    Relationship: Self    Best call back number: [phone]    What is the best time to reach you: ANYTIME    Who are you requesting to speak with (clinical staff, provider,  specific staff member): MEDICAL STAFF    What was the call regarding: PATIENT WAS SEEN ON 6/11/21 FOR HER KNEE. DR FARLEY STATED THAT SHE WOULD SENT IN PRESCRIPTION FOR IMFLAMMATION CREAM. PATIENT WANTS TO KNOW WHEN IT WILL BE SENT TO PHARMACY. PLEASE CALL TO ADVISE.

## 2021-06-16 ENCOUNTER — TELEPHONE (OUTPATIENT)
Dept: INTERNAL MEDICINE | Facility: CLINIC | Age: 21
End: 2021-06-16

## 2021-06-16 NOTE — TELEPHONE ENCOUNTER
Caller: SIMON VILLA    Relationship: Mother    Best call back number: [phone]    What is the best time to reach you: ANY    Who are you requesting to speak with (clinical staff, provider,  specific staff member): CLINICAL    What was the call regarding: MOTHER STATES PATIENT WOULD LIKE A CALL BACK REGARDING PHYSICAL THERAPY FOR HER KNEE    Do you require a callback: YES

## 2021-06-16 NOTE — TELEPHONE ENCOUNTER
I dont see that any kind of cream was sent in after visit on 6/11. Please send to [facility]- On file.

## 2021-06-16 NOTE — TELEPHONE ENCOUNTER
Patient is also wanting a referral for PT for her right knee sprain and pain. They want to use the one over on [address]  Please.

## 2021-06-17 NOTE — TELEPHONE ENCOUNTER
Called pt and left detailed msg that referral was placed and to call back if she has other questions.

## 2021-06-18 NOTE — TELEPHONE ENCOUNTER
[facility]       Hub please read: Dr. Barrios has placed your order for PT and the Voltaren cream has been sent to her pharmacy. Thanks.

## 2021-07-15 VITALS
DIASTOLIC BLOOD PRESSURE: 72 MMHG | SYSTOLIC BLOOD PRESSURE: 108 MMHG | WEIGHT: 108.12 LBS | HEIGHT: 57 IN | TEMPERATURE: 98.2 F | BODY MASS INDEX: 23.32 KG/M2 | OXYGEN SATURATION: 100 % | HEART RATE: 103 BPM

## 2021-08-03 RX ORDER — CETIRIZINE HYDROCHLORIDE 10 MG/1
10 TABLET ORAL DAILY
Qty: 30 TABLET | Refills: 0 | Status: SHIPPED | OUTPATIENT
Start: 2021-08-03 | End: 2021-09-23 | Stop reason: SDUPTHER

## 2021-09-23 ENCOUNTER — OFFICE VISIT (OUTPATIENT)
Dept: INTERNAL MEDICINE | Facility: CLINIC | Age: 21
End: 2021-09-23

## 2021-09-23 VITALS
HEIGHT: 57 IN | DIASTOLIC BLOOD PRESSURE: 88 MMHG | WEIGHT: 110 LBS | SYSTOLIC BLOOD PRESSURE: 128 MMHG | HEART RATE: 111 BPM | RESPIRATION RATE: 14 BRPM | BODY MASS INDEX: 23.73 KG/M2 | TEMPERATURE: 97.5 F | OXYGEN SATURATION: 100 %

## 2021-09-23 DIAGNOSIS — R05.9 COUGH: Primary | ICD-10-CM

## 2021-09-23 DIAGNOSIS — R09.81 NASAL CONGESTION: ICD-10-CM

## 2021-09-23 DIAGNOSIS — J01.90 ACUTE SINUSITIS, RECURRENCE NOT SPECIFIED, UNSPECIFIED LOCATION: ICD-10-CM

## 2021-09-23 LAB
EXPIRATION DATE: NORMAL
FLUAV AG UPPER RESP QL IA.RAPID: NOT DETECTED
FLUBV AG UPPER RESP QL IA.RAPID: NOT DETECTED
INTERNAL CONTROL: NORMAL
Lab: NORMAL
SARS-COV-2 AG UPPER RESP QL IA.RAPID: NOT DETECTED

## 2021-09-23 PROCEDURE — 87428 SARSCOV & INF VIR A&B AG IA: CPT | Performed by: PHYSICIAN ASSISTANT

## 2021-09-23 PROCEDURE — C9803 HOPD COVID-19 SPEC COLLECT: HCPCS | Performed by: PHYSICIAN ASSISTANT

## 2021-09-23 PROCEDURE — U0004 COV-19 TEST NON-CDC HGH THRU: HCPCS | Performed by: PHYSICIAN ASSISTANT

## 2021-09-23 PROCEDURE — 99213 OFFICE O/P EST LOW 20 MIN: CPT | Performed by: PHYSICIAN ASSISTANT

## 2021-09-23 RX ORDER — FLUTICASONE PROPIONATE 50 MCG
2 SPRAY, SUSPENSION (ML) NASAL DAILY
Qty: 11.1 ML | Refills: 1 | Status: SHIPPED | OUTPATIENT
Start: 2021-09-23 | End: 2022-04-29 | Stop reason: SDUPTHER

## 2021-09-23 RX ORDER — CETIRIZINE HYDROCHLORIDE 10 MG/1
10 TABLET ORAL DAILY
Qty: 90 TABLET | Refills: 1 | Status: SHIPPED | OUTPATIENT
Start: 2021-09-23 | End: 2021-12-01 | Stop reason: SDUPTHER

## 2021-09-23 NOTE — PATIENT INSTRUCTIONS
Sinusitis, Adult  Sinusitis is soreness and swelling (inflammation) of your sinuses. Sinuses are hollow spaces in the bones around your face. They are located:  · Around your eyes.  · In the middle of your forehead.  · Behind your nose.  · In your cheekbones.  Your sinuses and nasal passages are lined with a fluid called mucus. Mucus drains out of your sinuses. Swelling can trap mucus in your sinuses. This lets germs (bacteria, virus, or fungus) grow, which leads to infection. Most of the time, this condition is caused by a virus.  What are the causes?  This condition is caused by:  · Allergies.  · Asthma.  · Germs.  · Things that block your nose or sinuses.  · Growths in the nose (nasal polyps).  · Chemicals or irritants in the air.  · Fungus (rare).  What increases the risk?  You are more likely to develop this condition if:  · You have a weak body defense system (immune system).  · You do a lot of swimming or diving.  · You use nasal sprays too much.  · You smoke.  What are the signs or symptoms?  The main symptoms of this condition are pain and a feeling of pressure around the sinuses. Other symptoms include:  · Stuffy nose (congestion).  · Runny nose (drainage).  · Swelling and warmth in the sinuses.  · Headache.  · Toothache.  · A cough that may get worse at night.  · Mucus that collects in the throat or the back of the nose (postnasal drip).  · Being unable to smell and taste.  · Being very tired (fatigue).  · A fever.  · Sore throat.  · Bad breath.  How is this diagnosed?  This condition is diagnosed based on:  · Your symptoms.  · Your medical history.  · A physical exam.  · Tests to find out if your condition is short-term (acute) or long-term (chronic). Your doctor may:  ? Check your nose for growths (polyps).  ? Check your sinuses using a tool that has a light (endoscope).  ? Check for allergies or germs.  ? Do imaging tests, such as an MRI or CT scan.  How is this treated?  Treatment for this condition  depends on the cause and whether it is short-term or long-term.  · If caused by a virus, your symptoms should go away on their own within 10 days. You may be given medicines to relieve symptoms. They include:  ? Medicines that shrink swollen tissue in the nose.  ? Medicines that treat allergies (antihistamines).  ? A spray that treats swelling of the nostrils.   ? Rinses that help get rid of thick mucus in your nose (nasal saline washes).  · If caused by bacteria, your doctor may wait to see if you will get better without treatment. You may be given antibiotic medicine if you have:  ? A very bad infection.  ? A weak body defense system.  · If caused by growths in the nose, you may need to have surgery.  Follow these instructions at home:  Medicines  · Take, use, or apply over-the-counter and prescription medicines only as told by your doctor. These may include nasal sprays.  · If you were prescribed an antibiotic medicine, take it as told by your doctor. Do not stop taking the antibiotic even if you start to feel better.  Hydrate and humidify    · Drink enough water to keep your pee (urine) pale yellow.  · Use a cool mist humidifier to keep the humidity level in your home above 50%.  · Breathe in steam for 10-15 minutes, 3-4 times a day, or as told by your doctor. You can do this in the bathroom while a hot shower is running.  · Try not to spend time in cool or dry air.    Rest  · Rest as much as you can.  · Sleep with your head raised (elevated).  · Make sure you get enough sleep each night.  General instructions    · Put a warm, moist washcloth on your face 3-4 times a day, or as often as told by your doctor. This will help with discomfort.  · Wash your hands often with soap and water. If there is no soap and water, use hand .  · Do not smoke. Avoid being around people who are smoking (secondhand smoke).  · Keep all follow-up visits as told by your doctor. This is important.    Contact a doctor if:  · You  have a fever.  · Your symptoms get worse.  · Your symptoms do not get better within 10 days.  Get help right away if:  · You have a very bad headache.  · You cannot stop throwing up (vomiting).  · You have very bad pain or swelling around your face or eyes.  · You have trouble seeing.  · You feel confused.  · Your neck is stiff.  · You have trouble breathing.  Summary  · Sinusitis is swelling of your sinuses. Sinuses are hollow spaces in the bones around your face.  · This condition is caused by tissues in your nose that become inflamed or swollen. This traps germs. These can lead to infection.  · If you were prescribed an antibiotic medicine, take it as told by your doctor. Do not stop taking it even if you start to feel better.  · Keep all follow-up visits as told by your doctor. This is important.  This information is not intended to replace advice given to you by your health care provider. Make sure you discuss any questions you have with your health care provider.  Document Revised: 05/20/2019 Document Reviewed: 05/20/2019  [employer] Patient Education © 2021 [employer]

## 2021-09-23 NOTE — PROGRESS NOTES
"Chief Complaint  Cough    Subjective          Wanda Coffey presents to [facility]  Runny nose and nasal congestion x 1 wk  Phlegm feels like it is draining into chest  It has caused cough. Cough is dry  Denies wheezing, resp distress  Denies sore throat   She has tried mucinex.   She has increased water intake. Appetite is normal.   Denies body aches, chills, fever.   Has similar sx this time of year every year   She has been out of allergies.  She has not had covid vaccine.         History reviewed. No pertinent past medical history.     History reviewed. No pertinent surgical history.     Current Outpatient Medications on File Prior to Visit   Medication Sig Dispense Refill   • Diclofenac Sodium (Voltaren) 1 % gel gel Apply 4 g topically to the appropriate area as directed 4 (Four) Times a Day As Needed (as needed for pain). 100 g 1   • ibuprofen (ADVIL,MOTRIN) 600 MG tablet Take 600 mg by mouth 3 (Three) Times a Day With Meals. NULL     • triamcinolone (KENALOG) 0.5 % ointment apply A thin layer topically TO THE AFFECTED AREA(S) TWICE DAILY FOR 30 DAYS     • [DISCONTINUED] cetirizine (zyrTEC) 10 MG tablet Take 1 tablet by mouth Daily for 30 days. 30 tablet 0     No current facility-administered medications on file prior to visit.        No Known Allergies    Social History     Tobacco Use   Smoking Status Never Smoker   Smokeless Tobacco Never Used          Objective   Vital Signs:   /88   Pulse 111   Temp 97.5 °F (36.4 °C)   Resp 14   Ht 144.8 cm (57\")   Wt 49.9 kg (110 lb)   SpO2 100%   BMI 23.80 kg/m²     Physical Exam  Vitals reviewed.   Constitutional:       Appearance: Normal appearance.   HENT:      Head: Normocephalic and atraumatic.      Nose: Nose normal.      Mouth/Throat:      Mouth: Mucous membranes are moist.   Eyes:      Extraocular Movements: Extraocular movements intact.      Conjunctiva/sclera: Conjunctivae normal.      Pupils: " Pupils are equal, round, and reactive to light.   Cardiovascular:      Rate and Rhythm: Normal rate and regular rhythm.   Pulmonary:      Effort: Pulmonary effort is normal.      Breath sounds: Normal breath sounds.   Abdominal:      General: Abdomen is flat. Bowel sounds are normal.      Palpations: Abdomen is soft.   Musculoskeletal:         General: Normal range of motion.   Neurological:      General: No focal deficit present.      Mental Status: She is alert and oriented to person, place, and time.   Psychiatric:         Mood and Affect: Mood normal.        Result Review :                 Assessment and Plan    Diagnoses and all orders for this visit:    1. Cough (Primary)  Assessment & Plan:  Discussed need to screen for COVID due to symptoms. Rapid covid negative. Discussed covid quarantine and precautions in detail with patient. Encouraged to quarantine until results reported. Discussed symptomatic treatment at this time. Discussed reasons to seek care again- symptoms worsening, respiratory distress, fever not controlled with tylenol/motrin, decreased fluid intake or urine output. Patient understands and agrees with plan      Orders:  -     POCT SARS-CoV-2 Antigen ANDREW + Flu  -     COVID-19,CEPHEID/DIONICIO/BDMAX,COR/CECILIA/PAD/ELLEN IN-HOUSE(OR EMERGENT/ADD-ON),NP SWAB IN TRANSPORT MEDIA 3-4 HR TAT, RT-PCR - Swab, Nasopharynx; Future  -     COVID-19,CEPHEID/DIONICIO/BDMAX,COR/CECILIA/PAD/ELLEN IN-HOUSE(OR EMERGENT/ADD-ON),NP SWAB IN TRANSPORT MEDIA 3-4 HR TAT, RT-PCR - Swab, Nasopharynx    2. Nasal congestion  -     COVID-19,CEPHEID/DIONICIO/BDMAX,COR/CECILIA/PAD/ELLEN IN-HOUSE(OR EMERGENT/ADD-ON),NP SWAB IN TRANSPORT MEDIA 3-4 HR TAT, RT-PCR - Swab, Nasopharynx; Future  -     COVID-19,CEPHEID/DIONICIO/BDMAX,COR/CECILIA/PAD/ELLEN IN-HOUSE(OR EMERGENT/ADD-ON),NP SWAB IN TRANSPORT MEDIA 3-4 HR TAT, RT-PCR - Swab, Nasopharynx    3. Acute sinusitis, recurrence not specified, unspecified location  Assessment & Plan:  Discussed sinus infection. No need  for abx at this time. Discussed if restarting the zyrtec does not resolve the sx then we will rx abx. Increase water intake, rest, hand hygiene. Tylenol/motrin PRN fever or pain. Restart OTC antihistamines and nasal steroid. Patient will let us know if the symptoms are not resolved with conservative treatment.      Orders:  -     COVID-19,CEPHEID/DIONICIO/BDMAX,COR/CECILIA/PAD/ELLEN IN-HOUSE(OR EMERGENT/ADD-ON),NP SWAB IN TRANSPORT MEDIA 3-4 HR TAT, RT-PCR - Swab, Nasopharynx; Future  -     COVID-19,CEPHEID/DIONICIO/BDMAX,COR/CECILIA/PAD/ELLEN IN-HOUSE(OR EMERGENT/ADD-ON),NP SWAB IN TRANSPORT MEDIA 3-4 HR TAT, RT-PCR - Swab, Nasopharynx    Other orders  -     cetirizine (zyrTEC) 10 MG tablet; Take 1 tablet by mouth Daily for 30 days.  Dispense: 90 tablet; Refill: 1  -     fluticasone (Flonase) 50 MCG/ACT nasal spray; 2 sprays into the nostril(s) as directed by provider Daily.  Dispense: 11.1 mL; Refill: 1      Follow Up   Return if symptoms worsen or fail to improve.  Patient was given instructions and counseling regarding her condition or for health maintenance advice. Please see specific information pulled into the AVS if appropriate.

## 2021-09-23 NOTE — PROGRESS NOTES
I have reviewed the notes, assessments, and/or procedures performed by Sadie Blackwell PA-C, I concur with her documentation of Wanda Coffey.

## 2021-09-23 NOTE — ASSESSMENT & PLAN NOTE
Discussed sinus infection. No need for abx at this time. Discussed if restarting the zyrtec does not resolve the sx then we will rx abx. Increase water intake, rest, hand hygiene. Tylenol/motrin PRN fever or pain. Restart OTC antihistamines and nasal steroid. Patient will let us know if the symptoms are not resolved with conservative treatment.

## 2021-09-23 NOTE — ASSESSMENT & PLAN NOTE
Discussed need to screen for COVID due to symptoms. Rapid covid negative. Discussed covid quarantine and precautions in detail with patient. Encouraged to quarantine until results reported. Discussed symptomatic treatment at this time. Discussed reasons to seek care again- symptoms worsening, respiratory distress, fever not controlled with tylenol/motrin, decreased fluid intake or urine output. Patient understands and agrees with plan

## 2021-09-24 LAB — SARS-COV-2 RNA NOSE QL NAA+PROBE: NOT DETECTED

## 2021-12-01 RX ORDER — CETIRIZINE HYDROCHLORIDE 10 MG/1
10 TABLET ORAL DAILY
Qty: 90 TABLET | Refills: 1 | Status: SHIPPED | OUTPATIENT
Start: 2021-12-01 | End: 2022-04-29 | Stop reason: SDUPTHER

## 2022-02-08 ENCOUNTER — TELEPHONE (OUTPATIENT)
Dept: INTERNAL MEDICINE | Facility: CLINIC | Age: 22
End: 2022-02-08

## 2022-02-08 NOTE — TELEPHONE ENCOUNTER
Caller: Wanda Coffey    Relationship to patient: Self    Best call back number: [phone]    Chief complaint: SINUS CONGESTION, DRAINAGE, COUGH, VOMITING MUCUS    Type of visit: SAME DAY, OFFICE, MYCHART    Requested date: ASAP     Additional notes:THE PATIENT STATED SHE WANTS TO SEE PCP ONLY AND A CALL BACK TO DISCUSS ASAP

## 2022-02-09 ENCOUNTER — OFFICE VISIT (OUTPATIENT)
Dept: INTERNAL MEDICINE | Facility: CLINIC | Age: 22
End: 2022-02-09

## 2022-02-09 VITALS
RESPIRATION RATE: 18 BRPM | OXYGEN SATURATION: 99 % | TEMPERATURE: 98.4 F | SYSTOLIC BLOOD PRESSURE: 115 MMHG | WEIGHT: 111.8 LBS | BODY MASS INDEX: 24.12 KG/M2 | DIASTOLIC BLOOD PRESSURE: 75 MMHG | HEIGHT: 57 IN | HEART RATE: 98 BPM

## 2022-02-09 DIAGNOSIS — J34.89 NASAL DRAINAGE: ICD-10-CM

## 2022-02-09 DIAGNOSIS — J30.9 ALLERGIC RHINITIS, UNSPECIFIED SEASONALITY, UNSPECIFIED TRIGGER: ICD-10-CM

## 2022-02-09 DIAGNOSIS — R05.9 COUGH: Primary | ICD-10-CM

## 2022-02-09 PROCEDURE — 99213 OFFICE O/P EST LOW 20 MIN: CPT | Performed by: STUDENT IN AN ORGANIZED HEALTH CARE EDUCATION/TRAINING PROGRAM

## 2022-02-09 RX ORDER — BENZONATATE 100 MG/1
100 CAPSULE ORAL 3 TIMES DAILY PRN
Qty: 30 CAPSULE | Refills: 0 | Status: SHIPPED | OUTPATIENT
Start: 2022-02-09 | End: 2022-02-09

## 2022-02-09 RX ORDER — BENZONATATE 100 MG/1
100 CAPSULE ORAL 3 TIMES DAILY PRN
Qty: 30 CAPSULE | Refills: 0 | Status: SHIPPED | OUTPATIENT
Start: 2022-02-09 | End: 2022-02-19

## 2022-04-07 ENCOUNTER — OFFICE VISIT (OUTPATIENT)
Dept: INTERNAL MEDICINE | Facility: CLINIC | Age: 22
End: 2022-04-07

## 2022-04-07 VITALS
HEART RATE: 99 BPM | TEMPERATURE: 96 F | RESPIRATION RATE: 14 BRPM | BODY MASS INDEX: 24.27 KG/M2 | SYSTOLIC BLOOD PRESSURE: 116 MMHG | HEIGHT: 58 IN | WEIGHT: 115.6 LBS | DIASTOLIC BLOOD PRESSURE: 71 MMHG | OXYGEN SATURATION: 98 %

## 2022-04-07 DIAGNOSIS — J06.9 VIRAL UPPER RESPIRATORY INFECTION: Primary | ICD-10-CM

## 2022-04-07 PROCEDURE — 99213 OFFICE O/P EST LOW 20 MIN: CPT | Performed by: NURSE PRACTITIONER

## 2022-04-07 RX ORDER — BROMPHENIRAMINE MALEATE, PSEUDOEPHEDRINE HYDROCHLORIDE, AND DEXTROMETHORPHAN HYDROBROMIDE 2; 30; 10 MG/5ML; MG/5ML; MG/5ML
10 SYRUP ORAL 3 TIMES DAILY PRN
Qty: 120 ML | Refills: 0 | Status: SHIPPED | OUTPATIENT
Start: 2022-04-07 | End: 2022-09-14 | Stop reason: ALTCHOICE

## 2022-04-07 NOTE — ASSESSMENT & PLAN NOTE
Symptoms likely viral in etiology, we will send in Bromfed for her to use for symptoms.  Discussed the evolution of viral infections.  She should feel relief by Monday, if not advised to let us know, antibiotics may be necessary.  Patient agrees and understands.

## 2022-04-07 NOTE — PROGRESS NOTES
"Chief Complaint  Nasal Congestion    Dawson Coffey presents to [facility] for Concerns of sinus pressure, nasal congestion, runny nose, cough.  Patient reports that over the last 5 to 6 days she has noticed the symptoms.  She does have history of seasonal allergies, she normally uses Zyrtec and Flonase, however she only started these medicines back when she started having symptoms.  Patient denies any other significant symptoms like headache, fever, chills, sore throat, nausea, vomiting, diarrhea.  No ill contacts to her knowledge.         Review of Systems    Objective   Vital Signs:   /71   Pulse 99   Temp 96 °F (35.6 °C) (Temporal)   Resp 14   Ht 147.3 cm (58\")   Wt 52.4 kg (115 lb 9.6 oz)   SpO2 98%   BMI 24.16 kg/m²     Physical Exam  Vitals and nursing note reviewed.   Constitutional:       Appearance: Normal appearance. She is normal weight.   HENT:      Head: Normocephalic and atraumatic.      Right Ear: Tympanic membrane, ear canal and external ear normal.      Left Ear: Tympanic membrane, ear canal and external ear normal.      Nose: Congestion present.      Mouth/Throat:      Mouth: Mucous membranes are moist.      Pharynx: Oropharynx is clear.   Eyes:      Conjunctiva/sclera: Conjunctivae normal.      Pupils: Pupils are equal, round, and reactive to light.   Cardiovascular:      Rate and Rhythm: Normal rate and regular rhythm.   Pulmonary:      Effort: Pulmonary effort is normal.      Breath sounds: Normal breath sounds.   Neurological:      Mental Status: She is alert.   Psychiatric:         Mood and Affect: Mood normal.        Result Review :                   Diagnoses and all orders for this visit:    1. Viral upper respiratory infection (Primary)  Assessment & Plan:  Symptoms likely viral in etiology, we will send in Bromfed for her to use for symptoms.  Discussed the evolution of viral infections.  She should feel relief by Monday, if " not advised to let us know, antibiotics may be necessary.  Patient agrees and understands.      Other orders  -     brompheniramine-pseudoephedrine-DM 30-2-10 MG/5ML syrup; Take 10 mL by mouth 3 (Three) Times a Day As Needed for Congestion, Cough or Allergies.  Dispense: 120 mL; Refill: 0        Follow Up   Return if symptoms worsen or fail to improve.  Patient was given instructions and counseling regarding her condition or for health maintenance advice. Please see specific information pulled into the AVS if appropriate.     Reji Rooney, LOPEZ  4/7/2022  This note was electronically signed.

## 2022-04-29 ENCOUNTER — OFFICE VISIT (OUTPATIENT)
Dept: INTERNAL MEDICINE | Facility: CLINIC | Age: 22
End: 2022-04-29

## 2022-04-29 VITALS
HEIGHT: 58 IN | RESPIRATION RATE: 18 BRPM | WEIGHT: 116.2 LBS | BODY MASS INDEX: 24.39 KG/M2 | SYSTOLIC BLOOD PRESSURE: 122 MMHG | OXYGEN SATURATION: 100 % | DIASTOLIC BLOOD PRESSURE: 68 MMHG | TEMPERATURE: 98.5 F | HEART RATE: 108 BPM

## 2022-04-29 DIAGNOSIS — Z56.6 STRESS AT WORK: ICD-10-CM

## 2022-04-29 DIAGNOSIS — J30.9 ALLERGIC RHINITIS, UNSPECIFIED SEASONALITY, UNSPECIFIED TRIGGER: ICD-10-CM

## 2022-04-29 DIAGNOSIS — N92.6 LATE MENSES: Primary | ICD-10-CM

## 2022-04-29 PROCEDURE — 99213 OFFICE O/P EST LOW 20 MIN: CPT | Performed by: STUDENT IN AN ORGANIZED HEALTH CARE EDUCATION/TRAINING PROGRAM

## 2022-04-29 RX ORDER — CETIRIZINE HYDROCHLORIDE 10 MG/1
10 TABLET ORAL DAILY
Qty: 90 TABLET | Refills: 1 | Status: SHIPPED | OUTPATIENT
Start: 2022-04-29 | End: 2022-08-04 | Stop reason: SDUPTHER

## 2022-04-29 RX ORDER — FLUTICASONE PROPIONATE 50 MCG
2 SPRAY, SUSPENSION (ML) NASAL DAILY
Qty: 11.1 ML | Refills: 1 | Status: SHIPPED | OUTPATIENT
Start: 2022-04-29 | End: 2023-02-21

## 2022-04-29 SDOH — HEALTH STABILITY - MENTAL HEALTH: OTHER PHYSICAL AND MENTAL STRAIN RELATED TO WORK: Z56.6

## 2022-08-04 DIAGNOSIS — J30.9 ALLERGIC RHINITIS, UNSPECIFIED SEASONALITY, UNSPECIFIED TRIGGER: ICD-10-CM

## 2022-08-04 NOTE — TELEPHONE ENCOUNTER
Caller: Wanda Coffey    Relationship: Self    Best call back number: [phone]    Requested Prescriptions:   Requested Prescriptions     Pending Prescriptions Disp Refills   • cetirizine (zyrTEC) 10 MG tablet 90 tablet 1     Sig: Take 1 tablet by mouth Daily for 30 days.        Pharmacy where request should be sent: [address] [phone] [facility] [phone] [facility]     Additional details provided by patient: PATIENT CURRENTLY HAS 1 PILL LEFT.     Does the patient have less than a 3 day supply:  [x] Yes  [] No    Eryn Katz Rep   08/04/22 16:46 EDT

## 2022-08-05 RX ORDER — CETIRIZINE HYDROCHLORIDE 10 MG/1
10 TABLET ORAL DAILY
Qty: 90 TABLET | Refills: 3 | Status: SHIPPED | OUTPATIENT
Start: 2022-08-05 | End: 2022-09-12 | Stop reason: SDUPTHER

## 2022-09-12 DIAGNOSIS — J30.9 ALLERGIC RHINITIS, UNSPECIFIED SEASONALITY, UNSPECIFIED TRIGGER: ICD-10-CM

## 2022-09-12 RX ORDER — CETIRIZINE HYDROCHLORIDE 10 MG/1
10 TABLET ORAL DAILY
Qty: 90 TABLET | Refills: 3 | Status: SHIPPED | OUTPATIENT
Start: 2022-09-12 | End: 2022-12-16

## 2022-09-14 ENCOUNTER — OFFICE VISIT (OUTPATIENT)
Dept: INTERNAL MEDICINE | Facility: CLINIC | Age: 22
End: 2022-09-14

## 2022-09-14 ENCOUNTER — TELEPHONE (OUTPATIENT)
Dept: INTERNAL MEDICINE | Facility: CLINIC | Age: 22
End: 2022-09-14

## 2022-09-14 VITALS
WEIGHT: 116 LBS | DIASTOLIC BLOOD PRESSURE: 64 MMHG | BODY MASS INDEX: 24.25 KG/M2 | HEART RATE: 76 BPM | TEMPERATURE: 97.1 F | OXYGEN SATURATION: 97 % | SYSTOLIC BLOOD PRESSURE: 118 MMHG

## 2022-09-14 DIAGNOSIS — M79.645 PAIN OF LEFT THUMB: Primary | ICD-10-CM

## 2022-09-14 PROBLEM — J01.90 ACUTE SINUSITIS: Status: RESOLVED | Noted: 2021-09-23 | Resolved: 2022-09-14

## 2022-09-14 PROBLEM — J06.9 VIRAL UPPER RESPIRATORY INFECTION: Status: RESOLVED | Noted: 2022-04-07 | Resolved: 2022-09-14

## 2022-09-14 PROCEDURE — 99213 OFFICE O/P EST LOW 20 MIN: CPT | Performed by: PHYSICIAN ASSISTANT

## 2022-09-14 NOTE — PROGRESS NOTES
Chief Complaint  Wrist Pain    Dawson Coffey presents to [facility]  History of Present Illness  Pt here for eval of L thumb pain   Pain started at work 9/10. She denies injury, trauma, accidents. She states she works at [employer].  States finger randomly started swelling.  She went to  9/12 and had normal xray. She was given tylenol, diclofenac topical and po, and prednisone.  She is wearing brace. States hand pain is ''unbearable'' if she removes the brace. Unable to use L hand.  Pain worse with thumb movement.  She has been taking tylenol and using diclofenac gel. She has not started po nsaids.      Past Medical History:   Diagnosis Date   • Allergic Seasonal        History reviewed. No pertinent surgical history.     Current Outpatient Medications on File Prior to Visit   Medication Sig Dispense Refill   • acetaminophen (TYLENOL) 500 MG tablet Take 1 tablet by mouth Every 6 (Six) Hours As Needed for Mild Pain. 30 tablet 0   • cetirizine (zyrTEC) 10 MG tablet Take 1 tablet by mouth Daily for 30 days. 90 tablet 3   • diclofenac (VOLTAREN) 75 MG EC tablet Take 1 tablet by mouth 2 (Two) Times a Day. 30 tablet 0   • Diclofenac Sodium (Voltaren) 1 % gel gel Apply 4 g topically to the appropriate area as directed 4 (Four) Times a Day As Needed (as needed for pain). 100 g 1   • fluticasone (Flonase) 50 MCG/ACT nasal spray 2 sprays into the nostril(s) as directed by provider Daily. 11.1 mL 1   • predniSONE (DELTASONE) 20 MG tablet Take 2 tablets by mouth Daily. 10 tablet 0   • triamcinolone (KENALOG) 0.5 % ointment apply A thin layer topically TO THE AFFECTED AREA(S) TWICE DAILY FOR 30 DAYS     • [DISCONTINUED] brompheniramine-pseudoephedrine-DM 30-2-10 MG/5ML syrup Take 10 mL by mouth 3 (Three) Times a Day As Needed for Congestion, Cough or Allergies. 120 mL 0   • [DISCONTINUED] ibuprofen (ADVIL,MOTRIN) 600 MG tablet Take 600 mg by mouth 3  (Three) Times a Day With Meals. NULL       No current facility-administered medications on file prior to visit.        No Known Allergies    Social History     Tobacco Use   Smoking Status Never Smoker   Smokeless Tobacco Never Used          Objective   Vital Signs:   /64   Pulse 76   Temp 97.1 °F (36.2 °C)   Wt 52.6 kg (116 lb)   SpO2 97%   BMI 24.25 kg/m²     Physical Exam  Vitals reviewed.   Constitutional:       Appearance: Normal appearance.   HENT:      Head: Normocephalic and atraumatic.      Nose: Nose normal.      Mouth/Throat:      Mouth: Mucous membranes are moist.   Eyes:      Extraocular Movements: Extraocular movements intact.      Conjunctiva/sclera: Conjunctivae normal.      Pupils: Pupils are equal, round, and reactive to light.   Cardiovascular:      Rate and Rhythm: Normal rate and regular rhythm.   Pulmonary:      Effort: Pulmonary effort is normal.      Breath sounds: Normal breath sounds.   Abdominal:      General: Abdomen is flat. Bowel sounds are normal.      Palpations: Abdomen is soft.   Musculoskeletal:         General: Normal range of motion.   Neurological:      General: No focal deficit present.      Mental Status: She is alert and oriented to person, place, and time.   Psychiatric:         Mood and Affect: Mood normal.        Result Review :   The following data was reviewed by: Sadie Blackwell PA-C on 09/14/2022:    Data reviewed: Radiologic studies XR          Assessment and Plan    Diagnoses and all orders for this visit:    1. Pain of left thumb (Primary)  Assessment & Plan:  Discussed ddx. Will start PO NSAID. Pt declined toradol injection in office. Cont wearing splint, ice at home. Will refer to ortho if no improvement with conservative tx in 7-10 days. Pt understands and agrees with plan.    Orders:  -     Ambulatory Referral to Orthopedic Surgery      Follow Up   Return if symptoms worsen or fail to improve.  Patient was given instructions and counseling regarding  her condition or for health maintenance advice. Please see specific information pulled into the AVS if appropriate.

## 2022-09-14 NOTE — TELEPHONE ENCOUNTER
Red rule verified and correct.  Pt dx with Thumb tendonitis 9/12/22 at .    Thumb still hurting and causes pt to cry when touched.    While in the splint it feels better.    Mother req she be rechecked.    No appt available will se tomorrow.

## 2022-09-14 NOTE — TELEPHONE ENCOUNTER
Called mom back; Red rule verified and correct.    Appt opened up and pt changed to be seen today.

## 2022-09-14 NOTE — ASSESSMENT & PLAN NOTE
Discussed ddx. Will start PO NSAID. Pt declined toradol injection in office. Cont wearing splint, ice at home. Will refer to ortho if no improvement with conservative tx in 7-10 days. Pt understands and agrees with plan.

## 2022-09-20 ENCOUNTER — OFFICE VISIT (OUTPATIENT)
Dept: ORTHOPEDIC SURGERY | Facility: CLINIC | Age: 22
End: 2022-09-20

## 2022-09-20 ENCOUNTER — TELEPHONE (OUTPATIENT)
Dept: INTERNAL MEDICINE | Facility: CLINIC | Age: 22
End: 2022-09-20

## 2022-09-20 VITALS — OXYGEN SATURATION: 97 % | HEART RATE: 94 BPM | BODY MASS INDEX: 26.62 KG/M2 | WEIGHT: 123.4 LBS | HEIGHT: 57 IN

## 2022-09-20 DIAGNOSIS — M77.9 TENDONITIS: Primary | ICD-10-CM

## 2022-09-20 DIAGNOSIS — M79.645 THUMB PAIN, LEFT: ICD-10-CM

## 2022-09-20 PROCEDURE — 99203 OFFICE O/P NEW LOW 30 MIN: CPT | Performed by: ORTHOPAEDIC SURGERY

## 2022-09-20 NOTE — TELEPHONE ENCOUNTER
Pt is wanting to see if she can get an order for xray due to symptoms of Tendonitis worsening due. Please give a callback at [phone]

## 2022-09-20 NOTE — PROGRESS NOTES
"Chief Complaint  Initial Evaluation of the Left Wrist     Subjective      Wanda Coffey presents to [facility] for evaluation of the left wrist. The patient reports last Saturday she started having swelling and pain to her wrist. She was seen and evaluated at [facility] and given a brace and steroids and NSAIDS, with no relief. She reports pain with bending her thumb. She has no injury or trauma. She denies numbness and tingling. She reports she can not grab anything.     No Known Allergies     Social History     Socioeconomic History   • Marital status: Single   Tobacco Use   • Smoking status: Never Smoker   • Smokeless tobacco: Never Used   Substance and Sexual Activity   • Alcohol use: Never   • Drug use: Never   • Sexual activity: Never        Review of Systems     Objective   Vital Signs:   Pulse 94   Ht 144.8 cm (57\")   Wt 56 kg (123 lb 6.4 oz)   SpO2 97%   BMI 26.70 kg/m²       Physical Exam  Constitutional:       Appearance: Normal appearance. The patient is well-developed and normal weight.   HENT:      Head: Normocephalic.      Right Ear: Hearing and external ear normal.      Left Ear: Hearing and external ear normal.      Nose: Nose normal.   Eyes:      Conjunctiva/sclera: Conjunctivae normal.   Cardiovascular:      Rate and Rhythm: Normal rate.   Pulmonary:      Effort: Pulmonary effort is normal.      Breath sounds: No wheezing or rales.   Abdominal:      Palpations: Abdomen is soft.      Tenderness: There is no abdominal tenderness.   Musculoskeletal:      Cervical back: Normal range of motion.   Skin:     Findings: No rash.   Neurological:      Mental Status: The patient is alert and oriented to person, place, and time.   Psychiatric:         Mood and Affect: Mood and affect normal.         Judgment: Judgment normal.       Ortho Exam      Left wrist- no redness or signs of infection. Swelling to thenar area of the thumb. Full extension of the thumb. Limited flexion " of IP and MCP joint. No triggering or locking of the thumb. No pain with grind testing.     Procedures      Imaging Results (Most Recent)     None           Result Review :       XR Hand 3+ View Left    Result Date: 9/12/2022  Narrative: PROCEDURE: XR HAND 3+ VW LEFT  COMPARISON: None  INDICATIONS: Hand pain over proximal thumb trauma  FINDINGS:  BONES: Normal.  No significant arthropathy or acute abnormality.  SOFT TISSUES: Negative.  No visible soft tissue swelling.  EFFUSION: None visible.  OTHER: Negative.       Impression:  Normal examination.       YARA CARDONA MD       Electronically Signed and Approved By: YARA CARDONA MD on 9/12/2022 at 15:53                      Assessment and Plan     Diagnoses and all orders for this visit:    1. Tendonitis (Primary)    2. Thumb pain, left        Discussed the treatment plan with the patient.  Thumb brace given today. Plan to continue diclofenac. May consider MRI if symptoms persist.     Call or return if worsening symptoms.    Follow Up     1 week      Patient was given instructions and counseling regarding her condition or for health maintenance advice. Please see specific information pulled into the AVS if appropriate.     Scribed for Samuel Ma MD by Iris Yates.  09/20/22   15:32 EDT    I have personally performed the services described in this document as scribed by the above individual and it is both accurate and complete. Samuel Ma MD 09/21/22

## 2022-09-20 NOTE — TELEPHONE ENCOUNTER
Red rule verified and correct.    Pt still having severe pain in her thumb and is unable to work as a .    Pt was referred to ortho, but they have not called.  Gave mother the phone number.  Advised if they can not get her in in the next couple of days to CB and we will ask if pt can still get the toradol inj that was offered at her OV.  She did not recognize the name of the drug at the time or she would have gotten it then.

## 2022-09-21 ENCOUNTER — TELEPHONE (OUTPATIENT)
Dept: ORTHOPEDIC SURGERY | Facility: CLINIC | Age: 22
End: 2022-09-21

## 2022-09-21 DIAGNOSIS — M79.645 THUMB PAIN, LEFT: Primary | ICD-10-CM

## 2022-09-21 NOTE — TELEPHONE ENCOUNTER
Caller: SERGE PETERS    Relationship: SELF    Best call back number: [phone]    What orders are you requesting (i.e. lab or imaging): MRI     In what timeframe would the patient need to come in: ASAP    Where will you receive your lab/imaging services: ???    Additional notes: PATIENT STATES SHE HAS BEEN DEALING WITH PAIN AND SWELLING FOR A WHILE- JUST WANTS TO FEEL BETTER- WOULD LIKE TO MOVE FORWARD WITH HAVING THE MRI

## 2022-09-22 ENCOUNTER — TELEPHONE (OUTPATIENT)
Dept: INTERNAL MEDICINE | Facility: CLINIC | Age: 22
End: 2022-09-22

## 2022-09-23 ENCOUNTER — TELEPHONE (OUTPATIENT)
Dept: ORTHOPEDIC SURGERY | Facility: CLINIC | Age: 22
End: 2022-09-23

## 2022-09-23 NOTE — TELEPHONE ENCOUNTER
Caller: SERGE   Relationship to Patient: SELF   Phone Number: [phone]   Reason for Call: PATIENT CALLING CHECKING ON FMLA PAPERWORK, PATIENT STATES SHE NEEDS THAT FOR Monday

## 2022-10-06 ENCOUNTER — OFFICE VISIT (OUTPATIENT)
Dept: ORTHOPEDIC SURGERY | Facility: CLINIC | Age: 22
End: 2022-10-06

## 2022-10-06 VITALS — HEIGHT: 59 IN | WEIGHT: 123.4 LBS | BODY MASS INDEX: 24.88 KG/M2 | HEART RATE: 113 BPM | OXYGEN SATURATION: 97 %

## 2022-10-06 DIAGNOSIS — M77.9 TENDONITIS: ICD-10-CM

## 2022-10-06 DIAGNOSIS — M79.645 THUMB PAIN, LEFT: Primary | ICD-10-CM

## 2022-10-06 PROCEDURE — 99213 OFFICE O/P EST LOW 20 MIN: CPT | Performed by: PHYSICIAN ASSISTANT

## 2022-10-06 NOTE — PROGRESS NOTES
"Chief Complaint  Pain and Follow-up of the Left Wrist    Subjective          Wanda Coffey is a 22 y.o. female  presents to [facility] for   History of Present Illness      Patient presents with her grandmother for follow-up evaluation of left thumb pain.  Patient was seen by Dr. Ma on 9/20/2022 she states that her left thumb started causing pain with some swelling on 9/12.  She denies injury.  She went to urgent care they gave her steroid Tylenol and diclofenac she saw her primary care physician who referred her to our office.  She works at [employer] and she has been off of work.  Patient and her grandmother states she has an MRI scheduled for next week.  She has been using her thumb brace.  She states she only takes it off to put ice on her thumb      No Known Allergies     Social History     Socioeconomic History   • Marital status: Single   Tobacco Use   • Smoking status: Never Smoker   • Smokeless tobacco: Never Used   Vaping Use   • Vaping Use: Never used   Substance and Sexual Activity   • Alcohol use: Never   • Drug use: Never   • Sexual activity: Never        REVIEW OF SYSTEMS    Constitutional: Denies fevers, chills, weight loss  Cardiovascular: Denies chest pain, shortness of breath  Skin: Denies rashes, acute skin changes  Neurologic: Denies headache, loss of consciousness  MSK: Left thumb pain      Objective   Vital Signs:   Pulse 113   Ht 149.9 cm (59\")   Wt 56 kg (123 lb 6.4 oz)   SpO2 97%   BMI 24.92 kg/m²     Body mass index is 24.92 kg/m².    Physical Exam    Left thumb: Skin is intact, no erythema, no ecchymosis, mild thenar swelling, full range of motion of thumb with extension, limited flexion of IP and MCP joint, no triggering locking of thumb, capillary fill less than 3 seconds, negative grind test, negative Finkelstein test    Procedures    Imaging Results (Most Recent)     None           Result Review :   The following data was reviewed by: " GUILHERME Devi on 10/06/2022:               Assessment and Plan    Diagnoses and all orders for this visit:    1. Thumb pain, left (Primary)  -     Cancel: MRI Hand Left Without Contrast; Future    2. Tendonitis  -     Cancel: MRI Hand Left Without Contrast; Future        Patient called to have MRI scheduled after she left from her last visit, they did not know that they should schedule this appointment after the MRI, they were advised to continue plan for MRI, continue thumb brace, continue NSAIDs follow-up after MRI    Call or return if worsening symptoms.    Follow Up   Return for After MRI.  Patient was given instructions and counseling regarding her condition or for health maintenance advice. Please see specific information pulled into the AVS if appropriate.

## 2022-10-19 ENCOUNTER — HOSPITAL ENCOUNTER (OUTPATIENT)
Dept: MRI IMAGING | Facility: HOSPITAL | Age: 22
Discharge: HOME | End: 2022-10-19
Admitting: ORTHOPAEDIC SURGERY

## 2022-10-19 DIAGNOSIS — M79.645 THUMB PAIN, LEFT: ICD-10-CM

## 2022-10-19 PROCEDURE — 73221 MRI JOINT UPR EXTREM W/O DYE: CPT

## 2022-10-21 ENCOUNTER — OFFICE VISIT (OUTPATIENT)
Dept: ORTHOPEDIC SURGERY | Facility: CLINIC | Age: 22
End: 2022-10-21

## 2022-10-21 VITALS — BODY MASS INDEX: 25.08 KG/M2 | HEART RATE: 101 BPM | HEIGHT: 59 IN | OXYGEN SATURATION: 98 % | WEIGHT: 124.4 LBS

## 2022-10-21 DIAGNOSIS — S66.802A INJURY OF ULNAR COLLATERAL LIGAMENT OF LEFT WRIST, INITIAL ENCOUNTER: Primary | ICD-10-CM

## 2022-10-21 PROCEDURE — 99213 OFFICE O/P EST LOW 20 MIN: CPT | Performed by: ORTHOPAEDIC SURGERY

## 2022-10-21 RX ORDER — DICLOFENAC SODIUM 75 MG/1
75 TABLET, DELAYED RELEASE ORAL 2 TIMES DAILY
Qty: 60 TABLET | Refills: 1 | Status: SHIPPED | OUTPATIENT
Start: 2022-10-21 | End: 2022-10-26

## 2022-10-21 NOTE — PROGRESS NOTES
"Chief Complaint  Follow-up of the Left Hand     Subjective      Wanda Coffey presents to [facility] for follow up evaluation of the left hand. The patient recently had an MRI and is here today for those results. To review, The patient reports on 9/17/22 she started having swelling and pain to her wrist. She reports pain with bending her thumb. She has no injury or trauma. She denies numbness and tingling. She reports she can not grab anything.     No Known Allergies     Social History     Socioeconomic History   • Marital status: Single   Tobacco Use   • Smoking status: Never   • Smokeless tobacco: Never   Vaping Use   • Vaping Use: Never used   Substance and Sexual Activity   • Alcohol use: Never   • Drug use: Never   • Sexual activity: Never        Review of Systems     Objective   Vital Signs:   Pulse 101   Ht 149.9 cm (59\")   Wt 56.4 kg (124 lb 6.4 oz)   SpO2 98%   BMI 25.13 kg/m²       Physical Exam  Constitutional:       Appearance: Normal appearance. The patient is well-developed and normal weight.   HENT:      Head: Normocephalic.      Right Ear: Hearing and external ear normal.      Left Ear: Hearing and external ear normal.      Nose: Nose normal.   Eyes:      Conjunctiva/sclera: Conjunctivae normal.   Cardiovascular:      Rate and Rhythm: Normal rate.   Pulmonary:      Effort: Pulmonary effort is normal.      Breath sounds: No wheezing or rales.   Abdominal:      Palpations: Abdomen is soft.      Tenderness: There is no abdominal tenderness.   Musculoskeletal:      Cervical back: Normal range of motion.   Skin:     Findings: No rash.   Neurological:      Mental Status: The patient is alert and oriented to person, place, and time.   Psychiatric:         Mood and Affect: Mood and affect normal.         Judgment: Judgment normal.       Ortho Exam      Left thumb: Skin is intact, no erythema, no ecchymosis, mild thenar swelling, full range of motion of thumb with " extension, limited flexion of IP and MCP joint, no triggering locking of thumb, capillary fill less than 3 seconds, negative grind test, negative Finkelstein test    Procedures      Imaging Results (Most Recent)     None           Result Review :       MRI Wrist Left Without Contrast    Result Date: 10/20/2022  Narrative: PROCEDURE: MRI WRIST LEFT WO CONTRAST  COMPARISON: [facility], CR, XR HAND 3+ VW LEFT, 9/12/2022, 15:31.  INDICATIONS: ANTERIOR PROXIMAL LEFT THUMB AND WRIST PAIN X2 MONTHS. NO KNOWN INJURY. THUMB PAD SWELLING. DECREASED  STRENGTH.      TECHNIQUE: A complete multi-planar MRI of the wrist was performed.  FINDINGS:  The triangular fibrocartilage complex is grossly intact. The dorsal and ventral leaves of the scapholunate ligament are intact. The articulations of the wrist are grossly intact. There is no significant joint effusion. No significant articular cartilage defects are identified.  There appears to be a focal tear of the ulnar collateral ligament of the 1st MCP joint along the proximal phalangeal attachment (series 8, image 13).  There is mild thickening of the collateral ligaments of the MCP joint.  Remaining ligamentous structures appear intact.  No significant soft tissue thickening identified.  No evidence of retraction.  The tendons appear intact.  No significant tenosynovitis.  No redundant fibers identified.  No significant degenerative changes.  No focal abnormal bone marrow signal is seen. The cortical margins are intact. The flexor and extensor tendons overlying the wrist are grossly intact. No abnormal fluid collections are seen within the surrounding soft tissues.      Impression:   1. Focal non retracted tear of the ulnar collateral ligament of the 1st MCP joint at the proximal phalangeal attachment.  No redundant tendon fibers or soft tissue thickening identified.  There is thickening of the collateral ligaments of the 1st MCP joint which is likely related to previous  sprain. 2. Otherwise, no evidence of internal derangement or significant degenerative change..     NIDHI ROBERTS MD       Electronically Signed and Approved By: NIDHI ROBERTS MD on 10/20/2022 at 8:32                      Assessment and Plan     Diagnoses and all orders for this visit:    1. Injury of ulnar collateral ligament of left wrist, initial encounter (Primary)        Discussed the treatment plan with the patient.  I reviewed the MRI with the patient. Plan to continue the brace. Order for physical therapy given today. Work note given today to remain off work. Thumb spica brace given today. Refill of diclofenac given today.     Call or return if worsening symptoms.    Follow Up     4 weeks      Patient was given instructions and counseling regarding her condition or for health maintenance advice. Please see specific information pulled into the AVS if appropriate.     Scribed for Samuel Ma MD by Iris Yates.  10/21/22   13:50 EDT    I have personally performed the services described in this document as scribed by the above individual and it is both accurate and complete. Samuel Ma MD 10/22/22

## 2022-11-07 ENCOUNTER — TREATMENT (OUTPATIENT)
Dept: PHYSICAL THERAPY | Facility: CLINIC | Age: 22
End: 2022-11-07

## 2022-11-07 DIAGNOSIS — S63.642A SPRAIN OF ULNAR COLLATERAL LIGAMENT OF METACARPOPHALANGEAL (MCP) JOINT OF LEFT THUMB, INITIAL ENCOUNTER: Primary | ICD-10-CM

## 2022-11-07 DIAGNOSIS — R29.898 WEAKNESS OF LEFT HAND: ICD-10-CM

## 2022-11-07 DIAGNOSIS — M79.645 PAIN OF LEFT THUMB: ICD-10-CM

## 2022-11-07 PROCEDURE — 97165 OT EVAL LOW COMPLEX 30 MIN: CPT | Performed by: OCCUPATIONAL THERAPIST

## 2022-11-07 NOTE — PROGRESS NOTES
"Occupational Therapy Initial Evaluation and Plan of Care  Lisa  OT: [address]  [place] [id number]    Patient: Wanda Coffey   : 2000  Diagnosis/ICD-10 Code:  Sprain of ulnar collateral ligament of metacarpophalangeal (MCP) joint of left thumb, initial encounter [S63.642A]  Referring practitioner: Samuel Ma MD  Date of Initial Visit: 2022  Today's Date: 2022  Patient seen for 1 sessions           Subjective Questionnaire: QuickDASH:       Subjective Evaluation    History of Present Illness  Mechanism of injury: Pt is a RHD 23 y/o female who presents to therapy with c/o L thumb pain and weakness. Pt reports in 2022 while she was at work she began feeling pain and noticed swelling in her L thenar area. Pt reports no known cause of injury. Pt went to [facility] where MRI revealed:  Focal non retracted tear of the ulnar collateral ligament of the 1st MCP joint at the proximal phalangeal attachment.  No redundant tendon fibers or soft tissue thickening identified.  There is thickening of the collateral ligaments of the 1st MCP joint which is likely related to previous sprain.  On 10/21 pt returned to ortho where she was given a thumb spica brace. Pt was instructed to wear when out in public. Pt is a  at [employer] but is currently off due to injury.      Precautions and Work Restrictions: 5 lb lifting restrictionPain  Current pain ratin  At best pain ratin  At worst pain ratin  Location: left ulnar side of thumb  Quality: throbbing and pressure  Relieving factors: support and medications  Aggravating factors: lifting and movement  Progression: improved (\"a tiny bit\")    Social Support  Lives with: parents    Hand dominance: right    Diagnostic Tests  X-ray: normal  MRI studies: abnormal    Treatments  No previous or current treatments  Patient Goals  Patient goals for therapy: decreased edema, decreased pain, increased motion, increased strength, " "independence with ADLs/IADLs, return to sport/leisure activities and return to work  Patient goal: \"To try and get this hand better\"           Objective          Observations     Additional Wrist/Hand Observation Details  Pt presents in pre audelia thumb spica brace. Bruising along thenar region.    Tenderness     Left Wrist/Hand   Tenderness in the carpometacarpal joint.     Neurological Testing     Additional Neurological Details  Pt reports no numbness/tingling.    Active Range of Motion     Left Elbow   Forearm supination: WFL  Forearm pronation: WFL    Left Wrist   Wrist flexion: WFL  Wrist extension: WFL  Radial deviation: 15 degrees with pain  Ulnar deviation: WFL      Right Wrist   Radial deviation: 20 degrees     Additional Active Range of Motion Details  Pt displays full opposition to base of small finger, full radial and palmar abduction, and full flexion/extension of MP and IP joints.    Strength/Myotome Testing     Right Wrist/Hand      (2nd hand position)     Trial 1: 35 lbs    Trial 2: 25 lbs    Trial 3: 30 lbs    Average: 30 lbs    Thumb Strength   Key/Lateral Pinch     Trial 1: 13 lbs    Trial 2: 13 lbs    Trial 3: 14 lbs    Average: 13.33 lbs    Swelling     Left Wrist/Hand     Additional Swelling Details  38 cm figure of 8    Right Wrist/Hand     Additional Swelling Details  36 cm figure of 8          Assessment & Plan     Assessment  Impairments: abnormal or restricted ROM, activity intolerance, impaired physical strength, lacks appropriate home exercise program and pain with function  Functional Limitations: carrying objects, lifting, sleeping, pulling, pushing and uncomfortable because of pain  Assessment details: Pt will benefit from skilled OT secondary to decreased strength and increased pain that limits pt ability to complete ADLs.  Prognosis: good    Goals  Plan Goals: The patient complains of pain in the L thumb.  LTG 1  12 weeks:  The patient will report a pain rating of 1/10 with " movement in order to improve sleep quality and tolerance to performance of activities of daily living.   Status: New  STG 1  8 weeks:  The patient will report a pain rating of 4/10 with movement.  Status:  New    2.  The patient has limited strength of the L hand/thumb.  LTG 2  12 weeks:  The patient will demonstrate 25 lbs of  strength and 8 lbs lateral pinch in order to grasp and manipulate objects.  Status: New  STG 2  8 weeks:  The patient will demonstrate ability to tolerate MMT.  Status:  New    3.  Carrying, moving, and handling objects functional limitation.  LTG 3  12 weeks:  The patient will demonstrate 1-19 % limitation by achieving a score of 15/55 on the Quick DASH.  Status: New  STG 3  8 weeks:  The patient will demonstrate 1-19 % limitation by achieving a score of 19/55 on the Quick DASH.  Status:  New      Plan  Planned modality interventions: cryotherapy and thermotherapy (hydrocollator packs)  Planned therapy interventions: body mechanics training, fine motor coordination training, flexibility, functional ROM exercises, home exercise program, joint mobilization, manual therapy, postural training, soft tissue mobilization, strengthening, stretching and therapeutic activities  Frequency: 2x week  Duration in weeks: 12  Treatment plan discussed with: patient      Pt is indicated for skilled occupational therapy services.     Un-Timed:  Low Eval     60     Mins  07135    Timed Treatment:   0   mins   Total Treatment:     60   mins    OT SIGNATURE: Nicolás Reilly OT   DATE TREATMENT INITIATED: 11/7/2022  [place] License: [id number]    Initial Certification  Certification Period: 2/4/2023  I certify that the therapy services are furnished while this patient is under my care.  The services outlined above are required by this patient, and will be reviewed every 90 days.     PHYSICIAN: Samuel Ma MD      DATE:   MD NPI: [id number]  Please sign and return via fax to   [phone].. Thank you, [facility].

## 2022-11-16 ENCOUNTER — TREATMENT (OUTPATIENT)
Dept: PHYSICAL THERAPY | Facility: CLINIC | Age: 22
End: 2022-11-16

## 2022-11-16 DIAGNOSIS — S63.642A SPRAIN OF ULNAR COLLATERAL LIGAMENT OF METACARPOPHALANGEAL (MCP) JOINT OF LEFT THUMB, INITIAL ENCOUNTER: Primary | ICD-10-CM

## 2022-11-16 DIAGNOSIS — R29.898 WEAKNESS OF LEFT HAND: ICD-10-CM

## 2022-11-16 DIAGNOSIS — M79.645 PAIN OF LEFT THUMB: ICD-10-CM

## 2022-11-16 PROCEDURE — 97110 THERAPEUTIC EXERCISES: CPT | Performed by: OCCUPATIONAL THERAPIST

## 2022-11-16 NOTE — PROGRESS NOTES
"Occupational Therapy Daily Treatment Note  [address]   [place]  [address]      Patient: Wanda Coffey   : 2000  Referring practitioner: Samuel Ma MD  Date of Initial Visit: Type: THERAPY  Noted: 2022  Today's Date: 2022  Patient seen for 2 sessions           Subjective Evaluation    History of Present Illness  Mechanism of injury:  L thumb pain and weakness      MRI 10/19/22 revealed:  Focal non retracted tear of the ulnar collateral ligament of the 1st MCP joint at the proximal phalangeal attachment.  No redundant tendon fibers or soft tissue thickening identified.  There is thickening of the collateral ligaments of the 1st MCP joint which is likely related to previous sprain.        Subjective comment: No pain at rest.  States that she is weaning from thumb spica splint  Patient Occupation: [employer]   Precautions and Work Restrictions: 5 lb lifting restrictionPain  Current pain ratin  Location: left ulnar side of thumb  Symptom course: \"a tiny bit\"    Patient Goals  Patient goal: \"To try and get this hand better\"           Objective   See Exercise, Manual, and Modality Logs for complete treatment.       Assessment & Plan     Assessment    Assessment details: Good tolerance to exercises.  Continue with plan of care.        Visit Diagnoses:    ICD-10-CM ICD-9-CM   1. Sprain of ulnar collateral ligament of metacarpophalangeal (MCP) joint of left thumb, initial encounter  S63.642A 842.12   2. Pain of left thumb  M79.645 729.5   3. Weakness of left hand  R29.898 728.87       Progress per Plan of Care           Timed:  Manual Therapy:                 0     mins  23110  Therapeutic Exercise:      15     mins  69505     Neuromuscular reeducation       0     mins 09293  Therapeutic Activity              0     mins  90965  Ultrasound:                  0     mins  58587     Untimed:  Electrical Stimulation:    0     mins  71026 ( );  Fluidotherapy      0    "  mins  08386    Timed Treatment:   15   mins   Total Treatment:     15   mins    Candace Valentin OT, ROSINAT  Occupational Therapist    Electronically signed      [place] license #: [id number]

## 2022-11-18 ENCOUNTER — OFFICE VISIT (OUTPATIENT)
Dept: ORTHOPEDIC SURGERY | Facility: CLINIC | Age: 22
End: 2022-11-18

## 2022-11-18 VITALS — HEIGHT: 58 IN | OXYGEN SATURATION: 98 % | BODY MASS INDEX: 26.24 KG/M2 | HEART RATE: 91 BPM | WEIGHT: 125 LBS

## 2022-11-18 DIAGNOSIS — S66.802D INJURY OF ULNAR COLLATERAL LIGAMENT OF LEFT WRIST, SUBSEQUENT ENCOUNTER: Primary | ICD-10-CM

## 2022-11-18 PROCEDURE — 99213 OFFICE O/P EST LOW 20 MIN: CPT | Performed by: ORTHOPAEDIC SURGERY

## 2022-11-30 ENCOUNTER — TREATMENT (OUTPATIENT)
Dept: PHYSICAL THERAPY | Facility: CLINIC | Age: 22
End: 2022-11-30

## 2022-11-30 DIAGNOSIS — S63.642A SPRAIN OF ULNAR COLLATERAL LIGAMENT OF METACARPOPHALANGEAL (MCP) JOINT OF LEFT THUMB, INITIAL ENCOUNTER: Primary | ICD-10-CM

## 2022-11-30 DIAGNOSIS — M79.645 PAIN OF LEFT THUMB: ICD-10-CM

## 2022-11-30 DIAGNOSIS — R29.898 WEAKNESS OF LEFT HAND: ICD-10-CM

## 2022-11-30 PROCEDURE — 97110 THERAPEUTIC EXERCISES: CPT | Performed by: OCCUPATIONAL THERAPIST

## 2022-11-30 PROCEDURE — 97530 THERAPEUTIC ACTIVITIES: CPT | Performed by: OCCUPATIONAL THERAPIST

## 2022-11-30 NOTE — PROGRESS NOTES
"Occupational Therapy Daily Treatment Note  [address]   [place]  [address]      Patient: Wanda Coffey   : 2000  Referring practitioner: Samuel Ma MD  Date of Initial Visit: Type: THERAPY  Noted: 2022  Today's Date: 2022  Patient seen for 3 sessions           Subjective Evaluation    History of Present Illness  Mechanism of injury:  L thumb pain and weakness      MRI 10/19/22 revealed:  Focal non retracted tear of the ulnar collateral ligament of the 1st MCP joint at the proximal phalangeal attachment.  No redundant tendon fibers or soft tissue thickening identified.  There is thickening of the collateral ligaments of the 1st MCP joint which is likely related to previous sprain.        Subjective comment: No pain at rest.  States that she is weaning from thumb spica splint  Patient Occupation: [employer]   Precautions and Work Restrictions: 5 lb lifting restrictionPain  Current pain ratin  At worst pain ratin  Location: left ulnar side of thumb  Quality: dull ache  Symptom course: \"a tiny bit\"    Patient Goals  Patient goal: \"To try and get this hand better\"           Objective   See Exercise, Manual, and Modality Logs for complete treatment.       Assessment & Plan     Assessment    Assessment details: Good tolerance to exercises.  Continue with plan of care.        Visit Diagnoses:    ICD-10-CM ICD-9-CM   1. Sprain of ulnar collateral ligament of metacarpophalangeal (MCP) joint of left thumb, initial encounter  S63.642A 842.12   2. Pain of left thumb  M79.645 729.5   3. Weakness of left hand  R29.898 728.87       Progress per Plan of Care           Timed:  Manual Therapy:                 0     mins  40245  Therapeutic Exercise:      15     mins  11676     Neuromuscular reeducation       0     mins 90072  Therapeutic Activity              8     mins  82506  Ultrasound:                  0     mins  34752     Untimed:  Electrical Stimulation:    0     " mins  16075 ( );  Fluidotherapy      0     mins  42208    Timed Treatment:   23   mins   Total Treatment:     23   mins    Candace Valentin OT, CHT  Occupational Therapist    Electronically signed      [place] license #: [id number]

## 2022-12-07 ENCOUNTER — TREATMENT (OUTPATIENT)
Dept: PHYSICAL THERAPY | Facility: CLINIC | Age: 22
End: 2022-12-07

## 2022-12-07 DIAGNOSIS — M79.645 PAIN OF LEFT THUMB: ICD-10-CM

## 2022-12-07 DIAGNOSIS — S63.642A SPRAIN OF ULNAR COLLATERAL LIGAMENT OF METACARPOPHALANGEAL (MCP) JOINT OF LEFT THUMB, INITIAL ENCOUNTER: Primary | ICD-10-CM

## 2022-12-07 DIAGNOSIS — R29.898 WEAKNESS OF LEFT HAND: ICD-10-CM

## 2022-12-07 PROCEDURE — 97530 THERAPEUTIC ACTIVITIES: CPT | Performed by: OCCUPATIONAL THERAPIST

## 2022-12-07 PROCEDURE — 97110 THERAPEUTIC EXERCISES: CPT | Performed by: OCCUPATIONAL THERAPIST

## 2022-12-07 NOTE — PROGRESS NOTES
"Re-Assessment / Re-Certification      Patient: Wanda Coffey   : 2000  Diagnosis/ICD-10 Code:  Sprain of ulnar collateral ligament of metacarpophalangeal (MCP) joint of left thumb, initial encounter [S63.642A]  Referring practitioner: Samuel Ma MD  Date of Initial Visit: Type: THERAPY  Noted: 2022  Today's Date: 2022  Patient seen for 4 sessions      Subjective:     Quick Dash 15/55 1-19% functional limitation  Clinical Progress: improved  Home Program Compliance: Yes  Treatment has included: therapeutic exercise and therapeutic activity    Subjective Evaluation    History of Present Illness  Mechanism of injury:  L thumb pain and weakness      MRI 10/19/22 revealed:  Focal non retracted tear of the ulnar collateral ligament of the 1st MCP joint at the proximal phalangeal attachment.  No redundant tendon fibers or soft tissue thickening identified.  There is thickening of the collateral ligaments of the 1st MCP joint which is likely related to previous sprain.        Subjective comment: No pain at rest.  Still weaning from splint.  Patient Occupation: [employer]   Precautions and Work Restrictions: 5 lb lifting restrictionPain  Current pain ratin  At worst pain ratin  Location: left ulnar side of thumb  Quality: dull ache  Symptom course: \"a tiny bit\"    Patient Goals  Patient goal: \"To try and get this hand better\"       Objective          Tenderness     Additional Tenderness Details  No tenderness    Neurological Testing     Sensation     Wrist/Hand   Left   Intact: light touch    Additional Neurological Details  Pt reports no numbness/tingling.    Active Range of Motion     Left Elbow   Forearm supination: WFL  Forearm pronation: WFL    Left Wrist   Normal active range of motion    Right Wrist   Radial deviation: 20 degrees     Additional Active Range of Motion Details  Pt displays full opposition to base of small finger, full radial and palmar abduction, and full " flexion/extension of MP and IP joints.    Strength/Myotome Testing     Left Wrist/Hand      (2nd hand position)     Trial 1: 25 lbs    Trial 2: 24 lbs    Trial 3: 26 lbs    Average: 25 lbs    Thumb Strength  Key/Lateral Pinch     Trial 1: 8 lbs    Trial 2: 7.5 lbs    Trial 3: 9 lbs    Average: 8.17 lbs    Right Wrist/Hand      (2nd hand position)     Trial 1: 35 lbs    Trial 2: 35 lbs    Trial 3: 35 lbs    Average: 35 lbs    Thumb Strength   Key/Lateral Pinch     Trial 1: 13 lbs    Trial 2: 12.5 lbs    Trial 3: 13 lbs    Average: 12.83 lbs    Swelling     Left Wrist/Hand     Additional Swelling Details  No swelling noted.      Assessment & Plan     Assessment    Assessment details: Met all goals except LTG for pain.  Patient has not returned to work yet.  Strength has improved, however she is below norms for age group in B hands.  Will continue with plan of care with emphasis on strengthening.  Red putty provided for home use.      Plan Goals: The patient complains of pain in the L thumb.  LTG 1  12 weeks:  The patient will report a pain rating of 1/10 with movement in order to improve sleep quality and tolerance to performance of activities of daily living.   Status: Met  STG 1  8 weeks:  The patient will report a pain rating of 4/10 with movement.  Status:  Not met    2.  The patient has limited strength of the L hand/thumb.  LTG 2  12 weeks:  The patient will demonstrate 25 lbs of  strength and 8 lbs lateral pinch in order to grasp and manipulate objects.  Status: Met  STG 2  8 weeks:  The patient will demonstrate ability to tolerate MMT.  Status:  Met    3.  Carrying, moving, and handling objects functional limitation.  LTG 3  12 weeks:  The patient will demonstrate 1-19 % limitation by achieving a score of 15/55 on the Quick DASH.  Status: Met  STG 3  8 weeks:  The patient will demonstrate 1-19 % limitation by achieving a score of 19/55 on the Quick DASH.  Status:  Met      Visit Diagnoses:     ICD-10-CM ICD-9-CM   1. Sprain of ulnar collateral ligament of metacarpophalangeal (MCP) joint of left thumb, initial encounter  S63.660W 842.12   2. Pain of left thumb  M79.418 729.5   3. Weakness of left hand  R29.618 728.87       Progress toward previous goals: All Met       Recommendations: Continue as planned  Timeframe: 2 weeks  Prognosis to achieve goals: good    OT Signature: Candace Valentin OT, CHT    Electronically signed    [place] license #: [id number]    Based upon review of the patient's progress and continued therapy plan, it is my medical opinion that Wanda Coffey should continue physical therapy treatment at [facility]  [address] [place] [id number] [phone].    Signature: __________________________________  Samuel Ma MD  NPI: [id number]    Timed:  Manual Therapy:                 0     mins  25529  Therapeutic Exercise:      15     mins  34936     Neuromuscular reeducation       0     mins 17222  Therapeutic Activity              10     mins  42364  Ultrasound:                  0     mins  18765      Untimed:  Electrical Stimulation:    0     mins  29717 ( )  Fluidotherapy     0     mins  53456    Timed Treatment:   25   mins   Total Treatment:     25   mins

## 2022-12-14 ENCOUNTER — TREATMENT (OUTPATIENT)
Dept: PHYSICAL THERAPY | Facility: CLINIC | Age: 22
End: 2022-12-14

## 2022-12-14 DIAGNOSIS — M79.645 PAIN OF LEFT THUMB: ICD-10-CM

## 2022-12-14 DIAGNOSIS — S63.642A SPRAIN OF ULNAR COLLATERAL LIGAMENT OF METACARPOPHALANGEAL (MCP) JOINT OF LEFT THUMB, INITIAL ENCOUNTER: Primary | ICD-10-CM

## 2022-12-14 DIAGNOSIS — R29.898 WEAKNESS OF LEFT HAND: ICD-10-CM

## 2022-12-14 PROCEDURE — 97110 THERAPEUTIC EXERCISES: CPT | Performed by: OCCUPATIONAL THERAPIST

## 2022-12-14 PROCEDURE — 97530 THERAPEUTIC ACTIVITIES: CPT | Performed by: OCCUPATIONAL THERAPIST

## 2022-12-14 NOTE — PROGRESS NOTES
"Occupational Therapy Daily Treatment Note  [address] [facility], [address]   [place]  [address]      Patient: Wanda Coffey   : 2000  Referring practitioner: Samuel Ma MD  Date of Initial Visit: Type: THERAPY  Noted: 2022  Today's Date: 2022  Patient seen for 5 sessions           Subjective Evaluation    History of Present Illness  Mechanism of injury:  L thumb pain and weakness      MRI 10/19/22 revealed:  Focal non retracted tear of the ulnar collateral ligament of the 1st MCP joint at the proximal phalangeal attachment.  No redundant tendon fibers or soft tissue thickening identified.  There is thickening of the collateral ligaments of the 1st MCP joint which is likely related to previous sprain.        Subjective comment: States that she is back to work part-time.  Patient Occupation: [employer]   Precautions and Work Restrictions: 5 lb lifting restrictionPain  Current pain ratin  At worst pain ratin  Location: left ulnar side of thumb  Quality: dull ache  Symptom course: \"a tiny bit\"    Patient Goals  Patient goal: \"To try and get this hand better\"           Objective   See Exercise, Manual, and Modality Logs for complete treatment.       Assessment & Plan     Assessment    Assessment details: Good tolerance to exercises.  She is gradually weaning from her brace and has returned tois able work part-time.  Continue with plan of care.        Visit Diagnoses:    ICD-10-CM ICD-9-CM   1. Sprain of ulnar collateral ligament of metacarpophalangeal (MCP) joint of left thumb, initial encounter  S63.642A 842.12   2. Pain of left thumb  M79.645 729.5   3. Weakness of left hand  R29.898 728.87       Progress strengthening /stabilization /functional activity           Timed:  Manual Therapy:                 0     mins  31034  Therapeutic Exercise:      15     mins  57852     Neuromuscular reeducation       0     mins 94921  Therapeutic Activity              10     mins  " 36490  Ultrasound:                  0     mins  00904     Untimed:  Electrical Stimulation:    0     mins  15905 ( );  Fluidotherapy      0     mins  02544    Timed Treatment:   25   mins   Total Treatment:     25   mins    Candace Valentin OT, ROSINAT  Occupational Therapist    Electronically signed      [place] license #: [id number]

## 2022-12-16 ENCOUNTER — OFFICE VISIT (OUTPATIENT)
Dept: ORTHOPEDIC SURGERY | Facility: CLINIC | Age: 22
End: 2022-12-16

## 2022-12-16 VITALS — BODY MASS INDEX: 25.82 KG/M2 | OXYGEN SATURATION: 96 % | WEIGHT: 123 LBS | HEIGHT: 58 IN

## 2022-12-16 DIAGNOSIS — S66.802D INJURY OF ULNAR COLLATERAL LIGAMENT OF LEFT WRIST, SUBSEQUENT ENCOUNTER: Primary | ICD-10-CM

## 2022-12-16 PROBLEM — S66.802A: Status: ACTIVE | Noted: 2022-12-16

## 2022-12-16 PROCEDURE — 99213 OFFICE O/P EST LOW 20 MIN: CPT | Performed by: PHYSICIAN ASSISTANT

## 2022-12-16 RX ORDER — IBUPROFEN 800 MG/1
800 TABLET ORAL 2 TIMES DAILY
Qty: 60 TABLET | Refills: 1 | Status: SHIPPED | OUTPATIENT
Start: 2022-12-16

## 2022-12-16 NOTE — PROGRESS NOTES
"Chief Complaint  Pain and Follow-up of the Left Hand    Subjective          Wanda Coffey is a 22 y.o. female  presents to [facility] for   History of Present Illness      Patient presents with her grandmother for follow-up evaluation of left wrist pain/injury/ulnar collateral ligament tear.  Patient reviewed her MRI with Dr. Ma at her last visit he discussed conservative treatment with occupational therapy, work restrictions and thumb appearing brace and NSAIDs.  Patient has been taking ibuprofen she is requesting a description of this and she is taking over-the-counter.  She states she went back to work this past Monday she states she cannot lift heavy objects but states her thumb and wrist have improved some but states she still has pain with certain movements and feels weak in her hand with trying to do her job duties.  She would like to continue physical therapy.    No Known Allergies     Social History     Socioeconomic History   • Marital status: Single   Tobacco Use   • Smoking status: Never   • Smokeless tobacco: Never   Vaping Use   • Vaping Use: Never used   Substance and Sexual Activity   • Alcohol use: Never   • Drug use: Never   • Sexual activity: Never        REVIEW OF SYSTEMS    Constitutional: Denies fevers, chills, weight loss  Cardiovascular: Denies chest pain, shortness of breath  Skin: Denies rashes, acute skin changes  Neurologic: Denies headache, loss of consciousness  MSK: Left wrist pain      Objective   Vital Signs:   Ht 147.3 cm (58\")   Wt 55.8 kg (123 lb)   SpO2 96%   BMI 25.71 kg/m²     Body mass index is 25.71 kg/m².    Physical Exam    Left wrist: Skin is intact, swelling to the thenar area of the thumb, full extension of the thumb, limited flexion of IP and MCP joint, negative grind test, negative Finkelstein test, positive pulses    Procedures    Imaging Results (Most Recent)     None           Result Review :   The following data was " reviewed by: GUILHERME Devi on 12/16/2022:               Assessment and Plan    Diagnoses and all orders for this visit:    1. Injury of ulnar collateral ligament of left wrist, subsequent encounter (Primary)  -     Ambulatory Referral to Occupational Therapy    Other orders  -     ibuprofen (ADVIL,MOTRIN) 800 MG tablet; Take 1 tablet by mouth 2 (Two) Times a Day.  Dispense: 60 tablet; Refill: 1        Discussed diagnosis and treatment options with patient and her grandmother she will continue light duty at work, she was given prescription of ibuprofen take as directed discontinue diclofenac.  She will continue occupational therapy new order was given follow-up in 8 weeks, continue thumb brace with activity.    Call or return if worsening symptoms.    Follow Up   Return in about 8 weeks (around 2/10/2023) for Recheck.  Patient was given instructions and counseling regarding her condition or for health maintenance advice. Please see specific information pulled into the AVS if appropriate.

## 2022-12-21 ENCOUNTER — TREATMENT (OUTPATIENT)
Dept: PHYSICAL THERAPY | Facility: CLINIC | Age: 22
End: 2022-12-21

## 2022-12-21 DIAGNOSIS — M79.645 PAIN OF LEFT THUMB: ICD-10-CM

## 2022-12-21 DIAGNOSIS — S63.642A SPRAIN OF ULNAR COLLATERAL LIGAMENT OF METACARPOPHALANGEAL (MCP) JOINT OF LEFT THUMB, INITIAL ENCOUNTER: Primary | ICD-10-CM

## 2022-12-21 DIAGNOSIS — R29.898 WEAKNESS OF LEFT HAND: ICD-10-CM

## 2022-12-21 PROCEDURE — 97530 THERAPEUTIC ACTIVITIES: CPT | Performed by: OCCUPATIONAL THERAPIST

## 2022-12-21 PROCEDURE — 97110 THERAPEUTIC EXERCISES: CPT | Performed by: OCCUPATIONAL THERAPIST

## 2022-12-21 NOTE — PROGRESS NOTES
Occupational Therapy Daily Treatment Note  [place] OT: [address] [place] [address]      Patient: Wanda Coffey   : 2000  Diagnosis/ICD-10 Code:  Sprain of ulnar collateral ligament of metacarpophalangeal (MCP) joint of left thumb, initial encounter [S63.642A]  Referring practitioner: Samuel Ma MD  Date of Initial Visit: Type: THERAPY  Noted: 2022  Today's Date: 2022  Patient seen for 6 sessions             Subjective   Wanda Coffey reports: Pt reports no current pain. Pt reports she gets occasional dull, achy pain.    Objective     See Exercise, Manual, and Modality Logs for complete treatment.       Assessment/Plan  Pt tolerated well with no c/o increased pain this date.  Progress per Plan of Care           Timed:  Therapeutic Exercise:    23     mins  86113;       Therapeutic Activity:     15     mins  38486;       Timed Treatment:   38   mins   Total Treatment:     38   mins        Nicolás Reilly OT  Occupational Therapist  [place] License: [id number]  NPI: [id number]

## 2022-12-28 ENCOUNTER — TREATMENT (OUTPATIENT)
Dept: PHYSICAL THERAPY | Facility: CLINIC | Age: 22
End: 2022-12-28

## 2022-12-28 DIAGNOSIS — S63.642A SPRAIN OF ULNAR COLLATERAL LIGAMENT OF METACARPOPHALANGEAL (MCP) JOINT OF LEFT THUMB, INITIAL ENCOUNTER: Primary | ICD-10-CM

## 2022-12-28 DIAGNOSIS — M79.645 PAIN OF LEFT THUMB: ICD-10-CM

## 2022-12-28 DIAGNOSIS — R29.898 WEAKNESS OF LEFT HAND: ICD-10-CM

## 2022-12-28 PROCEDURE — 97110 THERAPEUTIC EXERCISES: CPT | Performed by: OCCUPATIONAL THERAPIST

## 2022-12-28 PROCEDURE — 97530 THERAPEUTIC ACTIVITIES: CPT | Performed by: OCCUPATIONAL THERAPIST

## 2022-12-28 NOTE — PROGRESS NOTES
"Chief Complaint  Follow-up of the Left Wrist     Subjective      Wanda Coffey presents to [facility] for follow up evaluation of the left wrist. The patient recently had an MRI and is here today for those results. To review, The patient reports on 9/12/22 she started having swelling and pain to her wrist. She has been wearing a brace. She reports pain with bending her thumb. She has no injury or trauma. She denies numbness and tingling. She reports she can not grab anything. She has been attending occupational therapy.     No Known Allergies     Social History     Socioeconomic History   • Marital status: Single   Tobacco Use   • Smoking status: Never   • Smokeless tobacco: Never   Vaping Use   • Vaping Use: Never used   Substance and Sexual Activity   • Alcohol use: Never   • Drug use: Never   • Sexual activity: Never        Review of Systems     Objective   Vital Signs:   Pulse 91   Ht 147.3 cm (58\")   Wt 56.7 kg (125 lb)   SpO2 98%   BMI 26.13 kg/m²       Physical Exam  Constitutional:       Appearance: Normal appearance. The patient is well-developed and normal weight.   HENT:      Head: Normocephalic.      Right Ear: Hearing and external ear normal.      Left Ear: Hearing and external ear normal.      Nose: Nose normal.   Eyes:      Conjunctiva/sclera: Conjunctivae normal.   Cardiovascular:      Rate and Rhythm: Normal rate.   Pulmonary:      Effort: Pulmonary effort is normal.      Breath sounds: No wheezing or rales.   Abdominal:      Palpations: Abdomen is soft.      Tenderness: There is no abdominal tenderness.   Musculoskeletal:      Cervical back: Normal range of motion.   Skin:     Findings: No rash.   Neurological:      Mental Status: The patient is alert and oriented to person, place, and time.   Psychiatric:         Mood and Affect: Mood and affect normal.         Judgment: Judgment normal.       Ortho Exam      Left wrist- no redness or signs of infection. " Swelling to thenar area of the thumb. Full extension of the thumb. Limited flexion of IP and MCP joint. No triggering or locking of the thumb. No pain with grind testing. Sensation to light touch median, radial, ulnar nerve. Positive AIN, PIN, ulnar nerve motor function. Positive pulses.     Procedures    Imaging Results (Most Recent)     None           Result Review :       MRI Wrist Left Without Contrast    Result Date: 10/20/2022  Narrative: PROCEDURE: MRI WRIST LEFT WO CONTRAST  COMPARISON: [facility], CR, XR HAND 3+ VW LEFT, 9/12/2022, 15:31.  INDICATIONS: ANTERIOR PROXIMAL LEFT THUMB AND WRIST PAIN X2 MONTHS. NO KNOWN INJURY. THUMB PAD SWELLING. DECREASED  STRENGTH.      TECHNIQUE: A complete multi-planar MRI of the wrist was performed.  FINDINGS:  The triangular fibrocartilage complex is grossly intact. The dorsal and ventral leaves of the scapholunate ligament are intact. The articulations of the wrist are grossly intact. There is no significant joint effusion. No significant articular cartilage defects are identified.  There appears to be a focal tear of the ulnar collateral ligament of the 1st MCP joint along the proximal phalangeal attachment (series 8, image 13).  There is mild thickening of the collateral ligaments of the MCP joint.  Remaining ligamentous structures appear intact.  No significant soft tissue thickening identified.  No evidence of retraction.  The tendons appear intact.  No significant tenosynovitis.  No redundant fibers identified.  No significant degenerative changes.  No focal abnormal bone marrow signal is seen. The cortical margins are intact. The flexor and extensor tendons overlying the wrist are grossly intact. No abnormal fluid collections are seen within the surrounding soft tissues.      Impression:   1. Focal non retracted tear of the ulnar collateral ligament of the 1st MCP joint at the proximal phalangeal attachment.  No redundant tendon fibers or soft tissue thickening  identified.  There is thickening of the collateral ligaments of the 1st MCP joint which is likely related to previous sprain. 2. Otherwise, no evidence of internal derangement or significant degenerative change..     NIDHI ROBERTS MD       Electronically Signed and Approved By: NIDHI ROBERTS MD on 10/20/2022 at 8:32                      Assessment and Plan     Diagnoses and all orders for this visit:    1. Injury of ulnar collateral ligament of left wrist, subsequent encounter (Primary)        Discussed the treatment plan with the patient.  I reviewed the MRI with the patient. Plan to proceed with occupational therapy to work on ROM and  strength.  Plan to continue NSAIDS as needed. Work note with restrictions given today of one handed duty. Thumb-o-prene brace given today.     Educated on risk of smoking. Discussed options for smoking cessation.   Call or return if worsening symptoms.    Follow Up     4 weeks      Patient was given instructions and counseling regarding her condition or for health maintenance advice. Please see specific information pulled into the AVS if appropriate.     Scribed for Samuel Ma MD by Iris Yates.  11/18/22   13:56 EST    I have personally performed the services described in this document as scribed by the above individual and it is both accurate and complete. Samuel Ma MD 11/19/22    [place] Counseling:  I discussed with the patient the risks of topical clascoterone including but not limited to erythema, scaling, itching, and stinging. Patient voiced their understanding.

## 2022-12-28 NOTE — PROGRESS NOTES
"Occupational Therapy Daily Treatment Note  [place] OT: [address] [place] [address]      Patient: Wanda Coffey   : 2000  Diagnosis/ICD-10 Code:  Sprain of ulnar collateral ligament of metacarpophalangeal (MCP) joint of left thumb, initial encounter [S63.642A]  Referring practitioner: Samuel Ma MD  Date of Initial Visit: Type: THERAPY  Noted: 2022  Today's Date: 2022  Patient seen for 7 sessions             Subjective   Wanda Coffey reports: \"The swelling hasn't been as bad.\" No current pain.    Objective     See Exercise, Manual, and Modality Logs for complete treatment.       Assessment/Plan  Pt displays improved strength this date. Pt tolerated well with no c/o increased pain.  Progress per Plan of Care           Timed:  Therapeutic Exercise:    15     mins  91044;     Therapeutic Activity:     8     mins  05621;       Timed Treatment:   23   mins   Total Treatment:     23   mins        Nicolás Reilly OT  Occupational Therapist  [place] License: [id number]  NPI: [id number]  "

## 2023-01-04 ENCOUNTER — TREATMENT (OUTPATIENT)
Dept: PHYSICAL THERAPY | Facility: CLINIC | Age: 23
End: 2023-01-04
Payer: COMMERCIAL

## 2023-01-04 DIAGNOSIS — R29.898 WEAKNESS OF LEFT HAND: ICD-10-CM

## 2023-01-04 DIAGNOSIS — M79.645 PAIN OF LEFT THUMB: ICD-10-CM

## 2023-01-04 DIAGNOSIS — S63.642A SPRAIN OF ULNAR COLLATERAL LIGAMENT OF METACARPOPHALANGEAL (MCP) JOINT OF LEFT THUMB, INITIAL ENCOUNTER: Primary | ICD-10-CM

## 2023-01-04 PROCEDURE — 97110 THERAPEUTIC EXERCISES: CPT | Performed by: OCCUPATIONAL THERAPIST

## 2023-01-04 PROCEDURE — 97530 THERAPEUTIC ACTIVITIES: CPT | Performed by: OCCUPATIONAL THERAPIST

## 2023-01-04 NOTE — PROGRESS NOTES
OT Re-evaluation/Progress Note  [place]  OT: [address]  [place] [address]    Patient: Wanda Coffey   : 2000  Diagnosis/ICD-10 Code:  Sprain of ulnar collateral ligament of metacarpophalangeal (MCP) joint of left thumb, initial encounter [S63.642A]  Referring practitioner: Samuel Ma MD  Date of Initial Visit: Type: THERAPY  Noted: 2022  Today's Date: 2023  Patient seen for 8 sessions      Subjective:   Subjective Questionnaire: QuickDASH:   Clinical Progress: improved  Home Program Compliance: Yes  Treatment has included: therapeutic exercise, manual therapy and moist heat    Subjective Evaluation    Pain  Current pain ratin         Objective          Tenderness     Additional Tenderness Details  No tenderness    Neurological Testing     Sensation     Wrist/Hand   Left   Intact: light touch    Additional Neurological Details  Pt reports no numbness/tingling.    Active Range of Motion     Left Elbow   Forearm supination: WFL  Forearm pronation: WFL    Left Wrist   Radial deviation: 20 degrees     Right Wrist   Normal active range of motion    Additional Active Range of Motion Details  Pt displays full opposition to base of small finger, full radial and palmar abduction, and full flexion/extension of MP and IP joints.    Strength/Myotome Testing     Left Wrist/Hand      (2nd hand position)     Trial 1: 25 lbs    Trial 2: 25 lbs    Trial 3: 21 lbs    Average: 23.67 lbs    Thumb Strength  Key/Lateral Pinch     Trial 1: 13 lbs    Trial 2: 11 lbs    Trial 3: 10 lbs    Average: 11.33 lbs    Right Wrist/Hand      (2nd hand position)     Trial 1: 35 lbs    Trial 2: 35 lbs    Trial 3: 35 lbs    Average: 35 lbs    Thumb Strength   Key/Lateral Pinch     Trial 1: 13 lbs    Trial 2: 12.5 lbs    Trial 3: 13 lbs    Average: 12.83 lbs    Swelling     Left Wrist/Hand     Additional Swelling Details  No swelling noted.      Assessment & Plan     Assessment  Impairments: abnormal  or restricted ROM, activity intolerance, impaired physical strength, lacks appropriate home exercise program and pain with function  Functional Limitations: carrying objects, lifting, sleeping, pulling, pushing and uncomfortable because of pain  Assessment details: Pt displays improved  and pinch compared to eval. Pt continues with pain with functional use that limits ability to complete ADLs. Pt met 3/3 STGs and 0/3 LTGs.  Prognosis: good    Goals  Plan Goals: The patient complains of pain in the L thumb.  LTG 1  12 weeks:  The patient will report a pain rating of 1/10 with movement in order to improve sleep quality and tolerance to performance of activities of daily living.   Status: Not met  STG 1  8 weeks:  The patient will report a pain rating of 4/10 with movement.  Status:  Met    2.  The patient has limited strength of the L hand/thumb.  LTG 2  12 weeks:  The patient will demonstrate 25 lbs of  strength and 8 lbs lateral pinch in order to grasp and manipulate objects.  Status: Partially met  STG 2  8 weeks:  The patient will demonstrate ability to tolerate MMT.  Status:  Met    3.  Carrying, moving, and handling objects functional limitation.  LTG 3  12 weeks:  The patient will demonstrate 1-19 % limitation by achieving a score of 15/55 on the Quick DASH.  Status: Not met  STG 3  8 weeks:  The patient will demonstrate 1-19 % limitation by achieving a score of 19/55 on the Quick DASH.  Status:  Met    Plan  Planned modality interventions: cryotherapy and thermotherapy (hydrocollator packs)  Planned therapy interventions: body mechanics training, fine motor coordination training, flexibility, functional ROM exercises, home exercise program, joint mobilization, manual therapy, postural training, soft tissue mobilization, strengthening, stretching and therapeutic activities  Frequency: 2x week  Duration in weeks: 12  Treatment plan discussed with: patient      Progress toward previous goals: Partially  Met      Recommendations: Continue as planned  Timeframe: 1 month  Prognosis to achieve goals: good    OT SIGNATURE: Nicolás Reilly OT   [place] License: [id number]    Based upon review of the patient's progress and continued therapy plan, it is my medical opinion that Wanda Coffey should continue physical therapy treatment at [facility]  [address] [place] [address] [place] [id number] [phone].    Signature: __________________________________  Samuel Ma MD MD NPI: [id number]  Timed:  Therapeutic Exercise:    15     mins  36183;     Therapeutic Activity:     8     mins  84911;       Timed Treatment:   23   mins   Total Treatment:     23   mins  Please sign and return via fax to [phone]  . Thank you, [facility].     Dapsone Pregnancy And Lactation Text: This medication is Pregnancy Category C and is not considered safe during pregnancy or breast feeding.

## 2023-01-11 ENCOUNTER — TREATMENT (OUTPATIENT)
Dept: PHYSICAL THERAPY | Facility: CLINIC | Age: 23
End: 2023-01-11
Payer: COMMERCIAL

## 2023-01-11 DIAGNOSIS — S63.642A SPRAIN OF ULNAR COLLATERAL LIGAMENT OF METACARPOPHALANGEAL (MCP) JOINT OF LEFT THUMB, INITIAL ENCOUNTER: Primary | ICD-10-CM

## 2023-01-11 DIAGNOSIS — M79.645 PAIN OF LEFT THUMB: ICD-10-CM

## 2023-01-11 DIAGNOSIS — R29.898 WEAKNESS OF LEFT HAND: ICD-10-CM

## 2023-01-11 PROCEDURE — 97530 THERAPEUTIC ACTIVITIES: CPT | Performed by: OCCUPATIONAL THERAPIST

## 2023-01-11 PROCEDURE — 97110 THERAPEUTIC EXERCISES: CPT | Performed by: OCCUPATIONAL THERAPIST

## 2023-01-11 NOTE — PROGRESS NOTES
Occupational Therapy Daily Treatment Note  [place] OT: [address] [place] [address]      Patient: Wanda Coffey   : 2000  Diagnosis/ICD-10 Code:  Sprain of ulnar collateral ligament of metacarpophalangeal (MCP) joint of left thumb, initial encounter [S63.642A]  Referring practitioner: Samuel Ma MD  Date of Initial Visit: Type: THERAPY  Noted: 2022  Today's Date: 2023  Patient seen for 9 sessions             Subjective   Wanda Coffey reports: No current pain. No significant changes.    Objective     See Exercise, Manual, and Modality Logs for complete treatment.       Assessment/Plan  Pt tolerated well with no c/o increased pain this date.  Progress per Plan of Care           Timed:  Therapeutic Exercise:    15     mins  12986;     Therapeutic Activity:     8     mins  88245;       Timed Treatment:   23   mins   Total Treatment:     23   mins        Nicolás Reilly OT  Occupational Therapist  [place] License: [id number]  NPI: [id number]

## 2023-01-18 ENCOUNTER — TREATMENT (OUTPATIENT)
Dept: PHYSICAL THERAPY | Facility: CLINIC | Age: 23
End: 2023-01-18
Payer: COMMERCIAL

## 2023-01-18 DIAGNOSIS — S63.642A SPRAIN OF ULNAR COLLATERAL LIGAMENT OF METACARPOPHALANGEAL (MCP) JOINT OF LEFT THUMB, INITIAL ENCOUNTER: Primary | ICD-10-CM

## 2023-01-18 DIAGNOSIS — R29.898 WEAKNESS OF LEFT HAND: ICD-10-CM

## 2023-01-18 DIAGNOSIS — M79.645 PAIN OF LEFT THUMB: ICD-10-CM

## 2023-01-18 PROCEDURE — 97110 THERAPEUTIC EXERCISES: CPT | Performed by: OCCUPATIONAL THERAPIST

## 2023-01-18 PROCEDURE — 97530 THERAPEUTIC ACTIVITIES: CPT | Performed by: OCCUPATIONAL THERAPIST

## 2023-01-18 NOTE — PROGRESS NOTES
"Occupational Therapy Daily Treatment Note  [place] OT: [address] [place] [address]      Patient: Wanda Coffey   : 2000  Diagnosis/ICD-10 Code:  Sprain of ulnar collateral ligament of metacarpophalangeal (MCP) joint of left thumb, initial encounter [S63.642A]  Referring practitioner: Samuel Ma MD  Date of Initial Visit: Type: THERAPY  Noted: 2022  Today's Date: 2023  Patient seen for 10 sessions             Subjective   Wanda Coffey reports: \"It's good today but yesterday it was hurting\". Pt reports it usually bothers her after working. No current pain.    Objective     See Exercise, Manual, and Modality Logs for complete treatment.       Assessment/Plan  Pt displays improved strength this date.  Progress per Plan of Care           Timed:  Therapeutic Exercise:    15     mins  53878;     Therapeutic Activity:     8     mins  33633;       Timed Treatment:   23   mins   Total Treatment:     23   mins        Nicolás Reilly OT  Occupational Therapist  [place] License: [id number]  NPI: [id number]  "

## 2023-01-25 ENCOUNTER — TREATMENT (OUTPATIENT)
Dept: PHYSICAL THERAPY | Facility: CLINIC | Age: 23
End: 2023-01-25
Payer: COMMERCIAL

## 2023-01-25 DIAGNOSIS — S63.642A SPRAIN OF ULNAR COLLATERAL LIGAMENT OF METACARPOPHALANGEAL (MCP) JOINT OF LEFT THUMB, INITIAL ENCOUNTER: Primary | ICD-10-CM

## 2023-01-25 DIAGNOSIS — M79.645 PAIN OF LEFT THUMB: ICD-10-CM

## 2023-01-25 DIAGNOSIS — R29.898 WEAKNESS OF LEFT HAND: ICD-10-CM

## 2023-01-25 PROCEDURE — 97530 THERAPEUTIC ACTIVITIES: CPT | Performed by: OCCUPATIONAL THERAPIST

## 2023-01-25 PROCEDURE — 97110 THERAPEUTIC EXERCISES: CPT | Performed by: OCCUPATIONAL THERAPIST

## 2023-01-25 NOTE — PROGRESS NOTES
"Occupational Therapy Daily Treatment/Discharge Note  [address] [facility], [address]   [place]  [address]      Patient: Wanda Coffey   : 2000  Referring practitioner: Samuel Ma MD  Date of Initial Visit: Type: THERAPY  Noted: 2022  Today's Date: 2023  Patient seen for 11 sessions           Subjective Evaluation    History of Present Illness  Mechanism of injury:  L thumb pain and weakness      MRI 10/19/22 revealed:  Focal non retracted tear of the ulnar collateral ligament of the 1st MCP joint at the proximal phalangeal attachment.  No redundant tendon fibers or soft tissue thickening identified.  There is thickening of the collateral ligaments of the 1st MCP joint which is likely related to previous sprain.        Subjective comment: Still working part-time.  Patient expresses concern about her ability to perform certain activities due to persistent weakness  Patient Occupation: [employer]   Precautions and Work Restrictions: 5 lb lifting restrictionPain  Current pain ratin  Location: left ulnar side of thumb  Symptom course: \"a tiny bit\"    Patient Goals  Patient goal: \"To try and get this hand better\"           Objective   See Exercise, Manual, and Modality Logs for complete treatment.       Assessment & Plan     Assessment    Assessment details: Good tolerance to exercises.  Partially met goal.  Patient is independent with HEP.  She is encouraged to continue to strengthen her hand and wean from brace as she builds her confidence to perform regular activities and work.   Discharge to [facility].        Visit Diagnoses:    ICD-10-CM ICD-9-CM   1. Sprain of ulnar collateral ligament of metacarpophalangeal (MCP) joint of left thumb, initial encounter  S63.642A 842.12   2. Pain of left thumb  M79.645 729.5   3. Weakness of left hand  R29.898 728.87       Discharge to [facility].  Emphasis on strengthening and weaning from brace for return to all regular activities and work.       "     Timed:  Manual Therapy:                 0     mins  89588  Therapeutic Exercise:      15     mins  09056     Neuromuscular reeducation       0     mins 71946  Therapeutic Activity              8     mins  62973  Ultrasound:                  0     mins  33276     Untimed:  Electrical Stimulation:    0     mins  53627 ( );  Fluidotherapy      0     mins  78105    Timed Treatment:   23   mins   Total Treatment:     23   mins    Candace Valentin OT, ROSINAT  Occupational Therapist    Electronically signed      [place] license #: [id number]

## 2023-02-10 ENCOUNTER — OFFICE VISIT (OUTPATIENT)
Dept: ORTHOPEDIC SURGERY | Facility: CLINIC | Age: 23
End: 2023-02-10
Payer: COMMERCIAL

## 2023-02-10 VITALS — WEIGHT: 123.02 LBS | OXYGEN SATURATION: 99 % | HEART RATE: 110 BPM | HEIGHT: 58 IN | BODY MASS INDEX: 25.82 KG/M2

## 2023-02-10 DIAGNOSIS — S66.802D INJURY OF ULNAR COLLATERAL LIGAMENT OF LEFT WRIST, SUBSEQUENT ENCOUNTER: Primary | ICD-10-CM

## 2023-02-10 PROCEDURE — 99213 OFFICE O/P EST LOW 20 MIN: CPT | Performed by: PHYSICIAN ASSISTANT

## 2023-02-10 NOTE — PROGRESS NOTES
"Chief Complaint  Pain and Follow-up of the Left Hand    Subjective          History of Present Illness      Wanda Coffey is a 22 y.o. female  presents to [facility] for     Patient presents for follow-up evaluation of left wrist pain, injury/ulnar collateral ligament tear.  Patient has been treating her injury conservatively with rest, wearing a brace and light duty, less hours at work.  She is also done physical/occupational therapy.  Patient has been taking ibuprofen.  She states she has had no improvement in her hand/wrist.  She points to the generalized thumb and generalized wrist region as her areas of pain.  She states she has tried to go to work she only works 3 hours in her wrist, thumb and hand hurt \"all the time \".  She states that medicine is not helping brace and is not helping she states she has been working on range of motion, she admits to weakness.  Denies numbness and tingling.      No Known Allergies     Social History     Socioeconomic History   • Marital status: Single   Tobacco Use   • Smoking status: Never   • Smokeless tobacco: Never   Vaping Use   • Vaping Use: Never used   Substance and Sexual Activity   • Alcohol use: Never   • Drug use: Never   • Sexual activity: Never        REVIEW OF SYSTEMS    Constitutional: Denies fevers, chills, weight loss  Cardiovascular: Denies chest pain, shortness of breath  Skin: Denies rashes, acute skin changes  Neurologic: Denies headache, loss of consciousness  MSK: Left wrist pain      Objective   Vital Signs:   Pulse 110   Ht 147.3 cm (58\")   Wt 55.8 kg (123 lb 0.3 oz)   SpO2 99%   BMI 25.71 kg/m²     Body mass index is 25.71 kg/m².    Physical Exam    Left wrist: Skin is intact, mild swelling in the thenar region, full extension of the thumb, limited flexion of IP and MCP joint negative grind test negative Finkelstein test, positive pulses.    Procedures    Imaging Results (Most Recent)     None           Result " Review :   The following data was reviewed by: GUILHERME Devi on 02/10/2023:               Assessment and Plan    Diagnoses and all orders for this visit:    1. Injury of ulnar collateral ligament of left wrist, subsequent encounter (Primary)  -     Ambulatory Referral to Hand Surgery        Discussed diagnosis and treatment options with the patient and her mother who was on the phone advised him recommend second opinion evaluation by [facility] in [place] follow-up as needed with our office.  Patient and mother expressed understanding    Call or return if worsening symptoms.    Follow Up   Return if symptoms worsen or fail to improve.  Patient was given instructions and counseling regarding her condition or for health maintenance advice. Please see specific information pulled into the AVS if appropriate.

## 2023-02-17 ENCOUNTER — TELEPHONE (OUTPATIENT)
Dept: ORTHOPEDIC SURGERY | Facility: CLINIC | Age: 23
End: 2023-02-17

## 2023-02-17 DIAGNOSIS — J30.9 ALLERGIC RHINITIS, UNSPECIFIED SEASONALITY, UNSPECIFIED TRIGGER: ICD-10-CM

## 2023-02-17 NOTE — TELEPHONE ENCOUNTER
SPOKE W/PT TO LET THEM KNOW FAX WAS RESENT - CONFIRMED WITH [facility] OFF[facility] CORRECT REFERRAL FAX # is [phone]

## 2023-02-17 NOTE — TELEPHONE ENCOUNTER
Caller: Wanda Coffey    Relationship: Self    Best call back number: [phone]     What orders are you requesting (i.e. lab or imaging): NEW LEFT HAND, WRIST MRI ORDER (PRIOR LEFT WRIST MRI 10-19-22 & LEFT HAND XR 09-12-22)     In what timeframe would the patient need to come in: ANY DAY ANY TIME EXCEPT NOT 04-11-23     Where will you receive your lab/imaging services:   PRIOR MRI DONE AT [facility] (OR WHICHEVER LOCATION CAN DO MRI THE QUICKEST)     Additional notes: PATIENT IS SCHEDULED FOR CONSULT AT [facility] ON 04-12-23 PER INA DOWNEY     PATIENT (& MOTHER SIMON VILLA) REQUESTING A NEW MRI TO BE DONE BEFORE 04-12-23 [facility] APPT SINCE LEFT HAND, WRIST NOT GETTING ANY BETTER & PRIOR IMAGING DONE SO LONG AGO     PLEASE CALL / LEAVE VMAIL NOTIFYING PATIENT IF / WHEN LEFT HAND, WRIST MRI ORDER ENTERED & SENT TO IMAGING FACILITY     ALSO PLEASE NOTIFY PATIENT IF IN-[facility]FC APPT w/INA DOWNEY WILL BE NEEDED TO DISCUSS NEW MRI RESULTS? OR IF RESULTS WILL BE DISCUSSED OVER THE PHONE?    THANKS

## 2023-02-17 NOTE — TELEPHONE ENCOUNTER
Provider: INA DOWNEY    Caller: SERGE PETERS    Relationship to Patient: SELF    Phone Number: [phone]    Reason for Call: PT SAID THAT THE REFERRAL DID NOT MAKE IT TO [facility]. PLEASE REFAX IT AND CONTACT PT WHEN IT HAS BEEN SENT.

## 2023-02-20 NOTE — TELEPHONE ENCOUNTER
Left voice mail for the patient with provider response for planned visit with [facility] hand specialist.

## 2023-02-21 RX ORDER — FLUTICASONE PROPIONATE 50 MCG
SPRAY, SUSPENSION (ML) NASAL
Qty: 16 G | Refills: 1 | Status: SHIPPED | OUTPATIENT
Start: 2023-02-21

## 2023-03-06 ENCOUNTER — TELEPHONE (OUTPATIENT)
Dept: ORTHOPEDIC SURGERY | Facility: CLINIC | Age: 23
End: 2023-03-06

## 2023-03-06 NOTE — TELEPHONE ENCOUNTER
Provider: DR. GIBSON  Caller: SERGE PETERS  Relationship to Patient: MOTHER  Phone Number: [phone]  Reason for Call: PATIENT'S MOTHER CALLED STATING PATIENT HAS BEEN EXPERIENCING RANDOM HAND SHAKINESS THAT LAST AROUND 5-10 MINUTES. STATES THAT [facility] CAN'T GET HER IN UNTIL April. SPOKE TO OFFICE, ADVISED TO SCHEDULE WITH . CAN THIS PATIENT BE SEEN AND IF SO IS THERE ANY EARLIER AVAILABILITY? PLEASE ADVISE.

## 2023-03-07 NOTE — TELEPHONE ENCOUNTER
Spoke with patient and patient will keep appointment with [facility], and has appointment with Dr Ma 03/10/2023

## 2023-03-07 NOTE — TELEPHONE ENCOUNTER
Patient last saw Edi for follow up. Per Dr. Ma would like to ask Edi what he recommends or what was discussed last with the patient as far as follow up

## 2023-03-10 ENCOUNTER — OFFICE VISIT (OUTPATIENT)
Dept: ORTHOPEDIC SURGERY | Facility: CLINIC | Age: 23
End: 2023-03-10
Payer: COMMERCIAL

## 2023-03-10 VITALS — HEART RATE: 84 BPM | OXYGEN SATURATION: 98 % | WEIGHT: 116.4 LBS | HEIGHT: 58 IN | BODY MASS INDEX: 24.43 KG/M2

## 2023-03-10 DIAGNOSIS — R25.1 TREMOR OF LEFT HAND: ICD-10-CM

## 2023-03-10 DIAGNOSIS — S66.802D INJURY OF ULNAR COLLATERAL LIGAMENT OF LEFT WRIST, SUBSEQUENT ENCOUNTER: Primary | ICD-10-CM

## 2023-03-10 PROCEDURE — 99213 OFFICE O/P EST LOW 20 MIN: CPT | Performed by: ORTHOPAEDIC SURGERY

## 2023-03-10 NOTE — PROGRESS NOTES
"Chief Complaint  Follow-up of the Left Hand     Subjective      Wanda Coffey presents to [facility] for follow up evaluation of the left hand. She has bee treating her left wrist UCL injury conservatively. She has been wearing a brace. She is scheduled to see a hand surgeon in April. She reports she has been having swelling and shaking of her hand. She has no other complaints.     No Known Allergies     Social History     Socioeconomic History   • Marital status: Single   Tobacco Use   • Smoking status: Never   • Smokeless tobacco: Never   Vaping Use   • Vaping Use: Never used   Substance and Sexual Activity   • Alcohol use: Never   • Drug use: Never   • Sexual activity: Never        Review of Systems     Objective   Vital Signs:   Pulse 84   Ht 147.3 cm (58\")   Wt 52.8 kg (116 lb 6.4 oz)   SpO2 98%   BMI 24.33 kg/m²       Physical Exam  Constitutional:       Appearance: Normal appearance. The patient is well-developed and normal weight.   HENT:      Head: Normocephalic.      Right Ear: Hearing and external ear normal.      Left Ear: Hearing and external ear normal.      Nose: Nose normal.   Eyes:      Conjunctiva/sclera: Conjunctivae normal.   Cardiovascular:      Rate and Rhythm: Normal rate.   Pulmonary:      Effort: Pulmonary effort is normal.      Breath sounds: No wheezing or rales.   Abdominal:      Palpations: Abdomen is soft.      Tenderness: There is no abdominal tenderness.   Musculoskeletal:      Cervical back: Normal range of motion.   Skin:     Findings: No rash.   Neurological:      Mental Status: The patient is alert and oriented to person, place, and time.   Psychiatric:         Mood and Affect: Mood and affect normal.         Judgment: Judgment normal.       Ortho Exam      Left wrist: Skin is intact, mild swelling in the thenar region, full extension of the thumb, limited flexion of IP and MCP joint negative grind test negative Finkelstein test, positive " pulses.    Procedures      Imaging Results (Most Recent)     None           Result Review :       No results found.           Assessment and Plan     Diagnoses and all orders for this visit:    1. Injury of ulnar collateral ligament of left wrist, subsequent encounter (Primary)    2. Tremor of left hand        Discussed the treatment plan with the patient.  Plan to proceed with the referral to [facility]. Plan for a referral to a neurologist to to evaluate her tremor.      Call or return if worsening symptoms.    Follow Up     PRN      Patient was given instructions and counseling regarding her condition or for health maintenance advice. Please see specific information pulled into the AVS if appropriate.     Scribed for Samuel Ma MD by Iris Yates.  03/10/23   10:52 EST    I have personally performed the services described in this document as scribed by the above individual and it is both accurate and complete. Samuel Ma MD 03/10/23

## 2023-04-12 ENCOUNTER — TELEPHONE (OUTPATIENT)
Dept: ORTHOPEDIC SURGERY | Facility: CLINIC | Age: 23
End: 2023-04-12
Payer: COMMERCIAL

## 2023-04-12 NOTE — TELEPHONE ENCOUNTER
Pt MOTHER CALLED AND STATES PT WENT TO [facility] WITH [facility] HOWEVER [facility] DID NOT HAVE COPY OF MRI AND COULD NOT TREAT HER AT THAT TIME PT MOTHER STATES [facility] TOLD PT TO COME BACK ONCE PT HAS SEEN DR. JEFFERSON AT [facility] IN THAT IS SCHEDULED IN June.I ADVISED THE PT MOTHER WE COULD SEND REFERRAL TO [place] [facility] IS SHE WISHES TO SEE ANOTHER PROVIDER  .PATIENT DOES NOT WISH GO TO ANOTHER PROVIDER OUTSIDE OF [place]..PROVIDED DIRECT NUMBER FOR PT TO CALL BACK IF SHE WISHES TO PROCEED WITH A REFERRAL TO ANOTHER PROVIDER.

## 2023-04-13 DIAGNOSIS — S66.802D INJURY OF ULNAR COLLATERAL LIGAMENT OF LEFT WRIST, SUBSEQUENT ENCOUNTER: Primary | ICD-10-CM

## 2023-04-13 DIAGNOSIS — M79.645 THUMB PAIN, LEFT: ICD-10-CM

## 2023-04-13 DIAGNOSIS — R25.1 TREMOR OF LEFT HAND: ICD-10-CM

## 2023-04-13 DIAGNOSIS — M77.9 TENDONITIS: ICD-10-CM

## 2023-04-13 DIAGNOSIS — M79.645 PAIN OF LEFT THUMB: ICD-10-CM

## 2023-05-15 ENCOUNTER — HOSPITAL ENCOUNTER (OUTPATIENT)
Dept: MRI IMAGING | Facility: HOSPITAL | Age: 23
Discharge: HOME | End: 2023-05-15
Payer: COMMERCIAL

## 2023-05-15 DIAGNOSIS — M77.9 TENDONITIS: ICD-10-CM

## 2023-05-15 DIAGNOSIS — R25.1 TREMOR OF LEFT HAND: ICD-10-CM

## 2023-05-15 DIAGNOSIS — M79.645 PAIN OF LEFT THUMB: ICD-10-CM

## 2023-05-15 DIAGNOSIS — M79.645 THUMB PAIN, LEFT: ICD-10-CM

## 2023-05-15 DIAGNOSIS — S66.802D INJURY OF ULNAR COLLATERAL LIGAMENT OF LEFT WRIST, SUBSEQUENT ENCOUNTER: ICD-10-CM

## 2023-05-15 PROCEDURE — 73218 MRI UPPER EXTREMITY W/O DYE: CPT

## 2023-05-17 ENCOUNTER — TELEPHONE (OUTPATIENT)
Dept: ORTHOPEDIC SURGERY | Facility: CLINIC | Age: 23
End: 2023-05-17
Payer: COMMERCIAL

## 2023-05-22 DIAGNOSIS — J30.9 ALLERGIC RHINITIS, UNSPECIFIED SEASONALITY, UNSPECIFIED TRIGGER: ICD-10-CM

## 2023-05-22 RX ORDER — FLUTICASONE PROPIONATE 50 MCG
SPRAY, SUSPENSION (ML) NASAL
Qty: 16 G | Refills: 1 | Status: SHIPPED | OUTPATIENT
Start: 2023-05-22

## 2023-06-01 ENCOUNTER — TELEPHONE (OUTPATIENT)
Dept: ORTHOPEDIC SURGERY | Facility: CLINIC | Age: 23
End: 2023-06-01

## 2023-06-01 NOTE — TELEPHONE ENCOUNTER
CALLED TO FOLLOW UP WITH PT FOLLOWING MRI TO SEE IF PT NEEDS ASSISTANCE SCHEDULING WITH [facility] .3X NO ANSWER NO VOICEMAIL    No

## 2023-06-13 ENCOUNTER — OFFICE VISIT (OUTPATIENT)
Dept: NEUROLOGY | Facility: CLINIC | Age: 23
End: 2023-06-13
Payer: COMMERCIAL

## 2023-06-13 ENCOUNTER — PATIENT ROUNDING (BHMG ONLY) (OUTPATIENT)
Dept: NEUROLOGY | Facility: CLINIC | Age: 23
End: 2023-06-13
Payer: COMMERCIAL

## 2023-06-13 VITALS
HEART RATE: 89 BPM | SYSTOLIC BLOOD PRESSURE: 126 MMHG | DIASTOLIC BLOOD PRESSURE: 66 MMHG | WEIGHT: 111 LBS | BODY MASS INDEX: 23.3 KG/M2 | HEIGHT: 58 IN

## 2023-06-13 DIAGNOSIS — R25.1 FUNCTIONAL TREMOR: Primary | ICD-10-CM

## 2023-06-13 DIAGNOSIS — G25.2 DYSTONIC TREMOR: ICD-10-CM

## 2023-06-13 DIAGNOSIS — G90.512 COMPLEX REGIONAL PAIN SYNDROME TYPE 1 OF LEFT UPPER EXTREMITY: ICD-10-CM

## 2023-06-13 PROBLEM — G90.50 COMPLEX REGIONAL PAIN SYNDROME I: Status: ACTIVE | Noted: 2023-06-13

## 2023-06-13 PROCEDURE — 1159F MED LIST DOCD IN RCRD: CPT | Performed by: PSYCHIATRY & NEUROLOGY

## 2023-06-13 PROCEDURE — 99204 OFFICE O/P NEW MOD 45 MIN: CPT | Performed by: PSYCHIATRY & NEUROLOGY

## 2023-06-13 PROCEDURE — 95885 MUSC TST DONE W/NERV TST LIM: CPT | Performed by: PSYCHIATRY & NEUROLOGY

## 2023-06-13 PROCEDURE — 95908 NRV CNDJ TST 3-4 STUDIES: CPT | Performed by: PSYCHIATRY & NEUROLOGY

## 2023-06-13 PROCEDURE — 1160F RVW MEDS BY RX/DR IN RCRD: CPT | Performed by: PSYCHIATRY & NEUROLOGY

## 2023-06-13 RX ORDER — DIPHENOXYLATE HYDROCHLORIDE AND ATROPINE SULFATE 2.5; .025 MG/1; MG/1
1 TABLET ORAL DAILY
COMMUNITY

## 2023-06-13 NOTE — PROGRESS NOTES
"Chief Complaint  Tremors    Subjective          Wanda Coffey is a 22 y.o. female who presents to [facility]  History of Present Illness  22-year-old woman evaluated for tremor in the left hand.  She states that she hurt her hand 8 months ago and she does not know how she hurt it.  There is pain in her left wrist left thumb area which is improving.  An MRI back and October 2022 showing a focal nonretracted tear on the ulnar collateral ligament of the first MCP joint at the proximal phalangeal attachment.  She had a recent MRI of her left hand on 5/15/2023 showing that the previous ulnar collateral ligament tear is no longer visualized and is likely healed.  He states that about 2 3 months ago she started to have shakiness in the left hand when she is doing activities.  Shows it in a certain position with pronation and gets worse.  It comes and goes.  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  It comes and goes during the daytime as well.  It can come and go without any triggers.    She states for the first 6 months it was painful to touch her left hand.  There is also changes in temperature that he was feeling cold.  She states that it was also puffy.  It did not have any skin discoloration.  Objective   Vital Signs:   /66 (BP Location: Right arm, Patient Position: Sitting, Cuff Size: Small Adult)   Pulse 89   Ht 147.3 cm (57.99\")   Wt 50.3 kg (111 lb)   BMI 23.21 kg/m²     Physical Exam   She is alert, fluent, phasic, follows commands well.  Optic disc are normal bilaterally EOMs are full directions gaze, facial strength is full.  There is no weakness of the upper or lower extremities.  Reflexes are normoactive and symmetrical in biceps, triceps, patellar's and ankles.  There is a tremor noted hands extended in the left hand which can be easily distracted.  It is worse with pronation " as well as flexion of the arm.  Supination and hyper pronation lessens the tremor.  It is distractible as well.  She does not have a tremor when doing finger-to-nose testing with the right hand on her left hand after a few seconds.  Archimedes spiral does not show a tremor in the left hand or the right hand.  Transferring water from 1 cup to the next as well as to drink out of a Styrofoam cup in the left hand shows a mild tremor but it is significantly different from the tremor that is wide amplitude and significant with pronation of her hand at mid level.  Station and gait is unremarkable.        Assessment and Plan  Diagnoses and all orders for this visit:    1. Functional tremor (Primary)  Assessment & Plan:  Discussed with her and her grandmother that the left hand tremor is most likely secondary to a functional tremor rather than dystonic tremor.  I discussed with them that I would like for her to see Dr. Cobian at the [facility] movement disorder clinic for second opinion.  I discussed with them that I will not do neuroimaging studies of her brain since I am sending her to [facility] movement disorder clinic for second opinion.  I discussed with them that her hand pain is probably causing the functional tremor and since it is improving I expect the more to improve as well.  If it is a dystonic tremor I discussed with them that options include Botox injections.  I will leave this up to the [facility] movement disorder clinic.  Thank you for let me participate in her care.    Orders:  -     Ambulatory Referral to Neurology    2. Dystonic tremor  -     Ambulatory Referral to Neurology    3. Complex regional pain syndrome type 1 of left upper extremity  Assessment & Plan:  Nerve conduction study and limited EMG is normal.  Her pain the left hand could be the residual of a complex regional pain syndrome since there is already healed ulnar collateral ligament.  I discussed with them  that I would leave it up to orthopedic surgery if she needs to continue wearing her brace but my opinion is that she needs to change her type of work that she does not do repetitive movements with her left wrist if it is exacerbating her pain.           Nerve Conduction Study:  4 nerves     EMG:  Limited    Total time spent with the patient and coordinating patient care was 45 minutes.    Follow Up  No follow-ups on file.  Patient was given instructions and counseling regarding her condition or for health maintenance advice. Please see specific information pulled into the AVS if appropriate.

## 2023-06-13 NOTE — ASSESSMENT & PLAN NOTE
Nerve conduction study and limited EMG is normal.  Her pain the left hand could be the residual of a complex regional pain syndrome since there is already healed ulnar collateral ligament.  I discussed with them that I would leave it up to orthopedic surgery if she needs to continue wearing her brace but my opinion is that she needs to change her type of work that she does not do repetitive movements with her left wrist if it is exacerbating her pain.

## 2023-06-13 NOTE — ASSESSMENT & PLAN NOTE
Discussed with her and her grandmother that the left hand tremor is most likely secondary to a functional tremor rather than dystonic tremor.  I discussed with them that I would like for her to see Dr. Cobian at the [facility] movement disorder clinic for second opinion.  I discussed with them that I will not do neuroimaging studies of her brain since I am sending her to [facility] movement disorder [facility] for second opinion.  I discussed with them that her hand pain is probably causing the functional tremor and since it is improving I expect the more to improve as well.  If it is a dystonic tremor I discussed with them that options include Botox injections.  I will leave this up to the [facility] movement disorder clinic.  Thank you for let me participate in her care.

## 2023-09-07 DIAGNOSIS — J30.9 ALLERGIC RHINITIS, UNSPECIFIED SEASONALITY, UNSPECIFIED TRIGGER: ICD-10-CM

## 2023-09-07 RX ORDER — CETIRIZINE HYDROCHLORIDE 10 MG/1
TABLET ORAL
Qty: 90 TABLET | Refills: 3 | Status: SHIPPED | OUTPATIENT
Start: 2023-09-07

## 2023-09-29 DIAGNOSIS — J30.9 ALLERGIC RHINITIS, UNSPECIFIED SEASONALITY, UNSPECIFIED TRIGGER: ICD-10-CM

## 2023-09-29 RX ORDER — FLUTICASONE PROPIONATE 50 MCG
SPRAY, SUSPENSION (ML) NASAL
Qty: 16 G | Refills: 1 | Status: SHIPPED | OUTPATIENT
Start: 2023-09-29

## 2023-10-02 ENCOUNTER — TELEPHONE (OUTPATIENT)
Dept: ORTHOPEDIC SURGERY | Facility: CLINIC | Age: 23
End: 2023-10-02
Payer: COMMERCIAL

## 2023-10-02 NOTE — TELEPHONE ENCOUNTER
Caller: Wanda Coffey    Relationship: Self    Best call back number: [phone]    What form or medical record are you requesting: UPDATED RELEASE WORK NOTE    Who is requesting this form or medical record from you: WORK    How would you like to receive the form or medical records (pick-up, mail, fax): [employer]    Timeframe paperwork needed: ASAP    Additional notes: MOTHER INFORMED PATIENT WORK IS REQUESTING UPDATED RELEASE NOTE SINCE LAST ONE WAS PROVIDED ABOUT A YEAR AGO. SHE SAID PATIENT STILL HAVING PAIN AND SHE STILL PART-TIME. MOTHER REQUESTED SAME INFORMATION ON THE LETTER BUT JUST UPDATED DATE IF POSSIBLE.

## 2023-12-12 DIAGNOSIS — J30.9 ALLERGIC RHINITIS, UNSPECIFIED SEASONALITY, UNSPECIFIED TRIGGER: ICD-10-CM

## 2023-12-12 RX ORDER — FLUTICASONE PROPIONATE 50 MCG
SPRAY, SUSPENSION (ML) NASAL
Qty: 16 G | Refills: 1 | Status: SHIPPED | OUTPATIENT
Start: 2023-12-12

## 2024-01-11 RX ORDER — IBUPROFEN 800 MG/1
800 TABLET ORAL 2 TIMES DAILY
Qty: 60 TABLET | Refills: 1 | Status: SHIPPED | OUTPATIENT
Start: 2024-01-11

## 2024-03-03 DIAGNOSIS — J30.9 ALLERGIC RHINITIS, UNSPECIFIED SEASONALITY, UNSPECIFIED TRIGGER: ICD-10-CM

## 2024-03-04 RX ORDER — FLUTICASONE PROPIONATE 50 MCG
2 SPRAY, SUSPENSION (ML) NASAL DAILY
Qty: 16 G | Refills: 1 | Status: SHIPPED | OUTPATIENT
Start: 2024-03-04

## 2024-03-27 RX ORDER — IBUPROFEN 800 MG/1
800 TABLET ORAL 2 TIMES DAILY
Qty: 60 TABLET | Refills: 1 | OUTPATIENT
Start: 2024-03-27

## 2024-04-24 DIAGNOSIS — J30.9 ALLERGIC RHINITIS, UNSPECIFIED SEASONALITY, UNSPECIFIED TRIGGER: ICD-10-CM

## 2024-04-24 RX ORDER — FLUTICASONE PROPIONATE 50 MCG
2 SPRAY, SUSPENSION (ML) NASAL DAILY
Qty: 16 G | Refills: 1 | Status: SHIPPED | OUTPATIENT
Start: 2024-04-24

## 2024-07-05 DIAGNOSIS — J30.9 ALLERGIC RHINITIS, UNSPECIFIED SEASONALITY, UNSPECIFIED TRIGGER: ICD-10-CM

## 2024-07-05 RX ORDER — FLUTICASONE PROPIONATE 50 MCG
2 SPRAY, SUSPENSION (ML) NASAL DAILY
Qty: 16 G | Refills: 1 | Status: SHIPPED | OUTPATIENT
Start: 2024-07-05

## 2024-09-14 DIAGNOSIS — J30.9 ALLERGIC RHINITIS, UNSPECIFIED SEASONALITY, UNSPECIFIED TRIGGER: ICD-10-CM

## 2024-09-16 RX ORDER — CETIRIZINE HYDROCHLORIDE 10 MG/1
TABLET ORAL
Qty: 90 TABLET | Refills: 3 | Status: SHIPPED | OUTPATIENT
Start: 2024-09-16

## 2024-09-16 RX ORDER — FLUTICASONE PROPIONATE 50 MCG
2 SPRAY, SUSPENSION (ML) NASAL DAILY
Qty: 16 G | Refills: 1 | Status: SHIPPED | OUTPATIENT
Start: 2024-09-16

## 2024-11-19 DIAGNOSIS — J30.9 ALLERGIC RHINITIS, UNSPECIFIED SEASONALITY, UNSPECIFIED TRIGGER: ICD-10-CM

## 2024-11-19 RX ORDER — FLUTICASONE PROPIONATE 50 MCG
2 SPRAY, SUSPENSION (ML) NASAL DAILY
Qty: 16 G | Refills: 0 | OUTPATIENT
Start: 2024-11-19

## 2024-11-19 RX ORDER — FLUTICASONE PROPIONATE 50 MCG
2 SPRAY, SUSPENSION (ML) NASAL DAILY
Qty: 16 G | Refills: 1 | Status: SHIPPED | OUTPATIENT
Start: 2024-11-19

## 2025-01-13 DIAGNOSIS — J30.9 ALLERGIC RHINITIS, UNSPECIFIED SEASONALITY, UNSPECIFIED TRIGGER: ICD-10-CM

## 2025-01-15 RX ORDER — FLUTICASONE PROPIONATE 50 MCG
2 SPRAY, SUSPENSION (ML) NASAL DAILY
Qty: 16 G | Refills: 1 | Status: SHIPPED | OUTPATIENT
Start: 2025-01-15

## 2025-03-15 DIAGNOSIS — J30.9 ALLERGIC RHINITIS, UNSPECIFIED SEASONALITY, UNSPECIFIED TRIGGER: ICD-10-CM

## 2025-03-17 RX ORDER — FLUTICASONE PROPIONATE 50 MCG
2 SPRAY, SUSPENSION (ML) NASAL DAILY
Qty: 16 G | Refills: 1 | Status: SHIPPED | OUTPATIENT
Start: 2025-03-17

## 2025-04-15 NOTE — TELEPHONE ENCOUNTER
Caller: SIMON VILLA    Relationship: Mother    Best call back number:[phone]     Requested Prescriptions:   Requested Prescriptions     Pending Prescriptions Disp Refills   • cetirizine (zyrTEC) 10 MG tablet 90 tablet 3     Sig: Take 1 tablet by mouth Daily for 30 days.        Pharmacy where request should be sent: [address] [phone] [facility] [phone] [facility]     Does the patient have less than a 3 day supply:  [] Yes  [x] No       Detail Level: Simple Render Post-Care Instructions In Note?: no Consent: The patient's consent was obtained including but not limited to risks of crusting, scabbing, blistering, scarring, darker or lighter pigmentary change, recurrence, incomplete removal and infection. Number Of Freeze-Thaw Cycles: 1 freeze-thaw cycle Post-Care Instructions: I reviewed with the patient in detail post-care instructions. Patient is to wear sunprotection, and avoid picking at any of the treated lesions. Pt may apply Vaseline to crusted or scabbing areas. Duration Of Freeze Thaw-Cycle (Seconds): 5 Show Aperture Variable?: Yes

## 2025-06-08 DIAGNOSIS — J30.9 ALLERGIC RHINITIS, UNSPECIFIED SEASONALITY, UNSPECIFIED TRIGGER: ICD-10-CM

## 2025-06-09 RX ORDER — FLUTICASONE PROPIONATE 50 MCG
2 SPRAY, SUSPENSION (ML) NASAL DAILY
Qty: 16 G | Refills: 1 | Status: SHIPPED | OUTPATIENT
Start: 2025-06-09

## 2025-08-28 DIAGNOSIS — J30.9 ALLERGIC RHINITIS, UNSPECIFIED SEASONALITY, UNSPECIFIED TRIGGER: ICD-10-CM

## 2025-08-28 RX ORDER — CETIRIZINE HYDROCHLORIDE 10 MG/1
10 TABLET ORAL DAILY
Qty: 90 TABLET | Refills: 0 | Status: SHIPPED | OUTPATIENT
Start: 2025-08-28

## 2025-08-28 RX ORDER — FLUTICASONE PROPIONATE 50 MCG
2 SPRAY, SUSPENSION (ML) NASAL DAILY
Qty: 16 G | Refills: 1 | Status: SHIPPED | OUTPATIENT
Start: 2025-08-28